# Patient Record
Sex: FEMALE
[De-identification: names, ages, dates, MRNs, and addresses within clinical notes are randomized per-mention and may not be internally consistent; named-entity substitution may affect disease eponyms.]

---

## 2021-05-22 ENCOUNTER — NURSE TRIAGE (OUTPATIENT)
Dept: OTHER | Facility: CLINIC | Age: 46
End: 2021-05-22

## 2021-05-22 NOTE — TELEPHONE ENCOUNTER
Reason for Disposition   Health Information question, no triage required and triager able to answer question    Answer Assessment - Initial Assessment Questions  1. REASON FOR CALL or QUESTION: \"What is your reason for calling today? \" or \"How can I best help you? \" or \"What question do you have that I can help answer? \"      Patient needing diabetic testing supplies that was ordered for patient prior to her discharge. Patient's mother advised to call the facility who discharged her.     Protocols used: INFORMATION ONLY CALL - NO TRIAGE-ADULT-

## 2023-09-13 ENCOUNTER — HOSPITAL ENCOUNTER (OUTPATIENT)
Dept: DATA CONVERSION | Facility: HOSPITAL | Age: 48
End: 2023-09-13

## 2023-09-13 DIAGNOSIS — I25.10 ATHEROSCLEROTIC HEART DISEASE OF NATIVE CORONARY ARTERY WITHOUT ANGINA PECTORIS: ICD-10-CM

## 2023-09-20 PROBLEM — E66.01 MORBID OBESITY (MULTI): Status: ACTIVE | Noted: 2023-09-20

## 2023-09-20 PROBLEM — E78.5 HYPERLIPIDEMIA: Status: ACTIVE | Noted: 2023-09-20

## 2023-09-20 PROBLEM — E55.9 VITAMIN D DEFICIENCY: Status: ACTIVE | Noted: 2023-09-20

## 2023-09-20 PROBLEM — E11.9 DIABETES MELLITUS WITHOUT COMPLICATION (MULTI): Status: ACTIVE | Noted: 2023-09-20

## 2023-09-20 PROBLEM — G47.10 HYPERSOMNIA: Status: ACTIVE | Noted: 2023-09-20

## 2023-09-20 PROBLEM — K21.9 GASTRO-ESOPHAGEAL REFLUX DISEASE WITHOUT ESOPHAGITIS: Status: ACTIVE | Noted: 2023-09-20

## 2023-09-20 PROBLEM — I10 ESSENTIAL HYPERTENSION: Status: ACTIVE | Noted: 2023-09-20

## 2023-09-20 PROBLEM — G47.33 OBSTRUCTIVE SLEEP APNEA: Status: ACTIVE | Noted: 2023-09-20

## 2023-09-20 PROBLEM — M19.90 IDIOPATHIC OSTEOARTHRITIS: Status: ACTIVE | Noted: 2023-09-20

## 2023-09-20 PROBLEM — F41.9 ANXIETY: Status: ACTIVE | Noted: 2023-09-20

## 2023-09-20 RX ORDER — DULAGLUTIDE 1.5 MG/.5ML
INJECTION, SOLUTION SUBCUTANEOUS
COMMUNITY
End: 2024-01-26 | Stop reason: HOSPADM

## 2023-09-20 RX ORDER — OMEPRAZOLE 40 MG/1
CAPSULE, DELAYED RELEASE ORAL
COMMUNITY
Start: 2023-04-28 | End: 2024-03-01 | Stop reason: SINTOL

## 2023-09-20 RX ORDER — EZETIMIBE 10 MG/1
TABLET ORAL
COMMUNITY
Start: 2023-08-22 | End: 2024-04-04

## 2023-09-20 RX ORDER — INSULIN DETEMIR 100 [IU]/ML
35 INJECTION, SOLUTION SUBCUTANEOUS NIGHTLY
COMMUNITY
End: 2024-01-26 | Stop reason: HOSPADM

## 2023-09-20 RX ORDER — ERGOCALCIFEROL 1.25 MG/1
CAPSULE ORAL
COMMUNITY
End: 2024-01-26 | Stop reason: HOSPADM

## 2023-09-20 RX ORDER — LISINOPRIL 40 MG/1
TABLET ORAL
COMMUNITY

## 2023-09-20 RX ORDER — LACTULOSE 10 G/15ML
SOLUTION ORAL
COMMUNITY
End: 2024-01-26 | Stop reason: HOSPADM

## 2023-09-20 RX ORDER — INSULIN ASPART 100 [IU]/ML
INJECTION, SOLUTION INTRAVENOUS; SUBCUTANEOUS
COMMUNITY
End: 2024-03-11

## 2023-09-20 RX ORDER — METFORMIN HYDROCHLORIDE 1000 MG/1
TABLET ORAL
COMMUNITY
End: 2024-01-26 | Stop reason: HOSPADM

## 2023-09-20 RX ORDER — AMLODIPINE BESYLATE 10 MG/1
TABLET ORAL
COMMUNITY

## 2023-09-20 RX ORDER — NAPROXEN SODIUM 220 MG/1
TABLET, FILM COATED ORAL
COMMUNITY
End: 2024-03-11

## 2023-10-09 ENCOUNTER — HOSPITAL ENCOUNTER (OUTPATIENT)
Dept: CARDIOLOGY | Facility: HOSPITAL | Age: 48
Discharge: HOME | End: 2023-10-09
Payer: COMMERCIAL

## 2023-10-09 ENCOUNTER — HOSPITAL ENCOUNTER (OUTPATIENT)
Dept: RADIOLOGY | Facility: HOSPITAL | Age: 48
Discharge: HOME | End: 2023-10-09
Payer: COMMERCIAL

## 2023-10-09 DIAGNOSIS — I25.10 ATHEROSCLEROTIC HEART DISEASE OF NATIVE CORONARY ARTERY WITHOUT ANGINA PECTORIS: ICD-10-CM

## 2023-10-09 PROCEDURE — 3430000001 HC RX 343 DIAGNOSTIC RADIOPHARMACEUTICALS: Performed by: INTERNAL MEDICINE

## 2023-10-09 PROCEDURE — A9502 TC99M TETROFOSMIN: HCPCS | Performed by: INTERNAL MEDICINE

## 2023-10-09 PROCEDURE — 93015 CV STRESS TEST SUPVJ I&R: CPT | Performed by: INTERNAL MEDICINE

## 2023-10-09 RX ORDER — REGADENOSON 0.08 MG/ML
INJECTION, SOLUTION INTRAVENOUS
Status: DISPENSED
Start: 2023-10-09 | End: 2023-10-09

## 2023-10-09 RX ADMIN — TETROFOSMIN 31 MILLICURIE: 0.23 INJECTION, POWDER, LYOPHILIZED, FOR SOLUTION INTRAVENOUS at 12:15

## 2023-10-09 NOTE — PROCEDURES
Procedure performed.  Lexiscan nuclear stress test.    Indications.  Chest pain and shortness of breath.    Brief history.  This is a very pleasantFemale referred by Dr. Smith for stress test as a work-up for bariatric surgery.  Patient denies any chest pain or shortness of breath.    Procedure details.  Informed consent was obtained after discussed patient risks benefits Patient unable to exercise on treadmill due to osteoarthritis and morbid obesity. Patient received 0.4 mg of Lexiscan followed by technetium 99 sestamibi radiotracer around 33 mCi.  Patient developed some shortness of breath but no chest pain.  EKGs remained stable.  Vitals remained stable.  Recovered well without arrhythmias.    Conclusion.  1.  Negative Lexiscan portion of nuclear stress test for ischemia.  2.  For final result please review radiology section where nuclear images result will be reported separately

## 2023-10-10 ENCOUNTER — HOSPITAL ENCOUNTER (OUTPATIENT)
Dept: RADIOLOGY | Facility: HOSPITAL | Age: 48
Discharge: HOME | End: 2023-10-10
Payer: COMMERCIAL

## 2023-10-10 PROCEDURE — A9502 TC99M TETROFOSMIN: HCPCS | Performed by: INTERNAL MEDICINE

## 2023-10-10 PROCEDURE — 3430000001 HC RX 343 DIAGNOSTIC RADIOPHARMACEUTICALS: Performed by: INTERNAL MEDICINE

## 2023-10-10 RX ADMIN — TETROFOSMIN 35 MILLICURIE: 0.23 INJECTION, POWDER, LYOPHILIZED, FOR SOLUTION INTRAVENOUS at 11:15

## 2023-10-25 ENCOUNTER — NUTRITION (OUTPATIENT)
Dept: SURGERY | Facility: CLINIC | Age: 48
End: 2023-10-25
Payer: COMMERCIAL

## 2023-10-25 VITALS — BODY MASS INDEX: 47.94 KG/M2 | WEIGHT: 293 LBS

## 2023-10-25 NOTE — PROGRESS NOTES
Weight Check    Current Weight: 297 lb  Current BMI:  47.94    Reshma is here for a weight check. I provided dietary clearance last month. Pt reports a diet recall of:  Breakfast- a boiled egg, a packet of tuna and a Yoplait light yogurt   Lunch- turkey sandwich, OR soup (2x this past month), OR baked chicken with cauliflower/broccoli, OR a fish sandwich from [employer].   Dinner- Chicken with coleslaw, vegetables and potatoes from [employer]. Pt reports eating potatoes more this past month.  Pt reports eating out 2x this past month.   Snacks: popcorn, pistachios, 1 pack of sunflower seeds   Beverages: water, added crystal light packets, diet coke   Exercise: walking 30 min everyday     Today's Lesson: Reviewed patient's dietary changes and food recall. Reviewed fats and their portion size. Provided a handout to build a healthy plate. Provided a bariatric nutrition guide. Reviewed starchy vs non-starchy vegetables.   Reshma and I reviewed maintaining wt loss during pre-op process.   Diet Goals: Practice the lifelong rules  Exercise Recommendations: Gradually work up to 150 minutes of moderate intensity exercise per week.  Behavior Goals:   1. Decrease snacking.   2. Increase non-starchy vegetables. Decrease/limit starchy vegetables (peas, corn, potatoes).   3. 3 lb wt loss goal.             Vanda Jin RD, LDN  Registered Dietitian, Licensed Dietitian Nutritionist

## 2023-11-17 ENCOUNTER — NUTRITION (OUTPATIENT)
Dept: SURGERY | Facility: CLINIC | Age: 48
End: 2023-11-17
Payer: COMMERCIAL

## 2023-11-17 VITALS — BODY MASS INDEX: 48.1 KG/M2 | WEIGHT: 293 LBS

## 2023-11-17 NOTE — PROGRESS NOTES
Weight Check    Current Weight: 298 lb  Current BMI:  48.10  25 lb wt loss goal (279 lb)     Daily Intake: 2-3 meals. Skipping lunch on occ due to not feeling hungry.   Breakfast (@ 8-8:30 am)- egg and torres sandwich, OR a tuna packet and 2 boiled eggs, OR a yogurt, OR a home-made chicken sandwich with vieyra and leftover chicken breast  Lunch- salad, OR a tuna packet, OR skip   Dinner (@5:30/6 pm)- Lean cusein meal, OR a chicken breast and a salad, OR smoked turkey with greens and corn beef, OR beef and broccoli with snow peas  Snacks: none   Beverages: water, diet pop, Gatorade zero   Exercise: walking 5x/week (M-F) for 30 minutes   Behavior/Diet Changes:  - Eating more greens  - Eliminated snacking   - Increased water intake   - Eliminated eating out     Lavera and I discussed pre-operative weight loss goal. We reviewed the lifelong rules. We discussed the possibility of starting a Robard diet.   Goals for next month:  1. 3 meals per day.  2. Begin to track meals via [employer]. Goal of 1500 calories per day or less.       Plan: Will follow up next month. Will continue to monitor pt's weight, dietary & behavior changes.         Vanda Jin RD, LDN  Registered Dietitian, Licensed Dietitian Nutritionist

## 2023-12-01 DIAGNOSIS — E66.01 OBESITY, MORBID (MULTI): ICD-10-CM

## 2023-12-20 ENCOUNTER — APPOINTMENT (OUTPATIENT)
Dept: SURGERY | Facility: CLINIC | Age: 48
End: 2023-12-20
Payer: COMMERCIAL

## 2024-01-02 ENCOUNTER — LAB (OUTPATIENT)
Dept: LAB | Facility: LAB | Age: 49
End: 2024-01-02
Payer: COMMERCIAL

## 2024-01-02 ENCOUNTER — CLINICAL SUPPORT (OUTPATIENT)
Dept: SURGERY | Facility: CLINIC | Age: 49
End: 2024-01-02
Payer: COMMERCIAL

## 2024-01-02 ENCOUNTER — OFFICE VISIT (OUTPATIENT)
Dept: SURGERY | Facility: CLINIC | Age: 49
End: 2024-01-02
Payer: COMMERCIAL

## 2024-01-02 VITALS
RESPIRATION RATE: 18 BRPM | SYSTOLIC BLOOD PRESSURE: 154 MMHG | BODY MASS INDEX: 47.09 KG/M2 | WEIGHT: 293 LBS | HEIGHT: 66 IN | DIASTOLIC BLOOD PRESSURE: 90 MMHG | HEART RATE: 85 BPM

## 2024-01-02 DIAGNOSIS — I10 PRIMARY HYPERTENSION: Primary | ICD-10-CM

## 2024-01-02 DIAGNOSIS — Z01.818 PREOP EXAMINATION: ICD-10-CM

## 2024-01-02 DIAGNOSIS — I10 PRIMARY HYPERTENSION: ICD-10-CM

## 2024-01-02 DIAGNOSIS — G47.33 OBSTRUCTIVE SLEEP APNEA: ICD-10-CM

## 2024-01-02 DIAGNOSIS — Z86.73 HISTORY OF CVA (CEREBROVASCULAR ACCIDENT): ICD-10-CM

## 2024-01-02 DIAGNOSIS — E11.9 DIABETES MELLITUS WITHOUT COMPLICATION (MULTI): ICD-10-CM

## 2024-01-02 LAB
ALBUMIN SERPL-MCNC: 3.9 G/DL (ref 3.5–5)
ALP BLD-CCNC: 102 U/L (ref 35–125)
ALT SERPL-CCNC: 25 U/L (ref 5–40)
ANION GAP SERPL CALC-SCNC: 15 MMOL/L
AST SERPL-CCNC: 17 U/L (ref 5–40)
BASOPHILS # BLD AUTO: 0.04 X10*3/UL (ref 0–0.1)
BASOPHILS NFR BLD AUTO: 0.5 %
BILIRUB SERPL-MCNC: 0.7 MG/DL (ref 0.1–1.2)
BUN SERPL-MCNC: 12 MG/DL (ref 8–25)
CALCIUM SERPL-MCNC: 9.9 MG/DL (ref 8.5–10.4)
CHLORIDE SERPL-SCNC: 102 MMOL/L (ref 97–107)
CO2 SERPL-SCNC: 26 MMOL/L (ref 24–31)
CREAT SERPL-MCNC: 1 MG/DL (ref 0.4–1.6)
EOSINOPHIL # BLD AUTO: 0.11 X10*3/UL (ref 0–0.7)
EOSINOPHIL NFR BLD AUTO: 1.4 %
ERYTHROCYTE [DISTWIDTH] IN BLOOD BY AUTOMATED COUNT: 12.9 % (ref 11.5–14.5)
GFR SERPL CREATININE-BSD FRML MDRD: 69 ML/MIN/1.73M*2
GLUCOSE SERPL-MCNC: 227 MG/DL (ref 65–99)
HCT VFR BLD AUTO: 35.7 % (ref 36–46)
HGB BLD-MCNC: 11.2 G/DL (ref 12–16)
IMM GRANULOCYTES # BLD AUTO: 0.06 X10*3/UL (ref 0–0.7)
IMM GRANULOCYTES NFR BLD AUTO: 0.8 % (ref 0–0.9)
LYMPHOCYTES # BLD AUTO: 3.36 X10*3/UL (ref 1.2–4.8)
LYMPHOCYTES NFR BLD AUTO: 43.5 %
MCH RBC QN AUTO: 30.1 PG (ref 26–34)
MCHC RBC AUTO-ENTMCNC: 31.4 G/DL (ref 32–36)
MCV RBC AUTO: 96 FL (ref 80–100)
MONOCYTES # BLD AUTO: 0.53 X10*3/UL (ref 0.1–1)
MONOCYTES NFR BLD AUTO: 6.9 %
NEUTROPHILS # BLD AUTO: 3.63 X10*3/UL (ref 1.2–7.7)
NEUTROPHILS NFR BLD AUTO: 46.9 %
NRBC BLD-RTO: 0 /100 WBCS (ref 0–0)
PLATELET # BLD AUTO: 330 X10*3/UL (ref 150–450)
POTASSIUM SERPL-SCNC: 4.2 MMOL/L (ref 3.4–5.1)
PROT SERPL-MCNC: 7.6 G/DL (ref 5.9–7.9)
RBC # BLD AUTO: 3.72 X10*6/UL (ref 4–5.2)
SODIUM SERPL-SCNC: 143 MMOL/L (ref 133–145)
WBC # BLD AUTO: 7.7 X10*3/UL (ref 4.4–11.3)

## 2024-01-02 PROCEDURE — 4010F ACE/ARB THERAPY RXD/TAKEN: CPT | Performed by: INTERNAL MEDICINE

## 2024-01-02 PROCEDURE — 36415 COLL VENOUS BLD VENIPUNCTURE: CPT

## 2024-01-02 PROCEDURE — 80053 COMPREHEN METABOLIC PANEL: CPT

## 2024-01-02 PROCEDURE — 3080F DIAST BP >= 90 MM HG: CPT | Performed by: INTERNAL MEDICINE

## 2024-01-02 PROCEDURE — 1036F TOBACCO NON-USER: CPT | Performed by: INTERNAL MEDICINE

## 2024-01-02 PROCEDURE — 3077F SYST BP >= 140 MM HG: CPT | Performed by: INTERNAL MEDICINE

## 2024-01-02 PROCEDURE — 99214 OFFICE O/P EST MOD 30 MIN: CPT | Performed by: INTERNAL MEDICINE

## 2024-01-02 PROCEDURE — 85025 COMPLETE CBC W/AUTO DIFF WBC: CPT

## 2024-01-02 ASSESSMENT — ENCOUNTER SYMPTOMS
COUGH: 0
DIARRHEA: 0
PALPITATIONS: 0
CONSTIPATION: 0
ABDOMINAL PAIN: 0
DIZZINESS: 0
LOSS OF SENSATION IN FEET: 0
NAUSEA: 0
SHORTNESS OF BREATH: 0
OCCASIONAL FEELINGS OF UNSTEADINESS: 0
DEPRESSION: 0
ARTHRALGIAS: 0

## 2024-01-02 ASSESSMENT — PAIN SCALES - GENERAL: PAINLEVEL: 0-NO PAIN

## 2024-01-02 ASSESSMENT — PATIENT HEALTH QUESTIONNAIRE - PHQ9
SUM OF ALL RESPONSES TO PHQ9 QUESTIONS 1 AND 2: 0
2. FEELING DOWN, DEPRESSED OR HOPELESS: NOT AT ALL
1. LITTLE INTEREST OR PLEASURE IN DOING THINGS: NOT AT ALL

## 2024-01-02 NOTE — PATIENT INSTRUCTIONS
Stop Trulicity one week before surgery. Day before surgery take medications as usual except Metformin, Jardiance, and only take half doses of insulin. No meds the day of surgery. Will discuss with Dr Smith her stress test results and get cardiac clearance. Will check UA since she will start Robard diet

## 2024-01-02 NOTE — PROGRESS NOTES
"Subjective   Patient ID: Reshma Francisco is a 48 y.o. female who presents for preoperative clearance for gastric bypass surgery. Currently has 3 meals a day, protein with each meal. Does not snack. Does not drink sugary beverages. Regarding exercise, she walks regularly. She monitors her blood glucose and reports readings of 150-160. Patient uses CPAP (~5 years old) nightly and tolerates it well. Hx of stroke. She has not taken aspirin for the last year after recommendation from neurologist. She received cardiac clearance from Dr. Smith on Sept 13th and underwent stress test.    Diagnostics Reviewed: 10/2023 stress test showed nontransmural infarction of L lateral wall and septum  Labs Reviewed: 8/2023 cholesterol high. 6/2023 labs WNL        Review of Systems   Respiratory:  Negative for cough and shortness of breath.    Cardiovascular:  Negative for chest pain and palpitations.   Gastrointestinal:  Negative for abdominal pain, constipation, diarrhea and nausea.   Musculoskeletal:  Negative for arthralgias.   Neurological:  Negative for dizziness.       Objective   /90   Pulse 85   Resp 18   Ht 1.676 m (5' 6\")   Wt 138 kg (305 lb)   BMI 49.23 kg/m²     Physical Exam  Constitutional:       Appearance: She is obese.   HENT:      Mouth/Throat:      Comments: Dentition ok  Cardiovascular:      Rate and Rhythm: Normal rate and regular rhythm.   Pulmonary:      Breath sounds: Normal breath sounds.   Abdominal:      General: Bowel sounds are normal.      Palpations: Abdomen is soft.   Musculoskeletal:         General: No swelling.   Neurological:      Mental Status: She is alert.         Assessment/Plan   Diagnoses and all orders for this visit:  Primary hypertension  -     Comprehensive Metabolic Panel; Future  -     CBC and Auto Differential; Future  -     Urinalysis with Reflex Microscopic; Future  Preop examination  -     Comprehensive Metabolic Panel; Future  -     CBC and Auto Differential; " Future  Diabetes mellitus without complication (CMS/HCC)  Obstructive sleep apnea  History of CVA (cerebrovascular accident)      Scribe Attestation  By signing my name below, I, Doris Contreras, Scribe attest that this documentation has been prepared under the direction and in the presence of Jaz Crews MD.

## 2024-01-02 NOTE — PROGRESS NOTES
Pre-Operative Dietary Education     Current Weight: 305 lbs   Current BMI: 49.23     Note : Vanda (RD) reviewed Robard diet for pt to follow before surgery.   Goal weight before surgery: 279 lbs     In class settings, reviewed thin liquids diet that patient will be required to follow for 2 weeks after surgery. Reviewed low calorie fluids along with protein shakes and products that can be consumed. Emphasized the importance of following diet guidelines and recommendations after surgery to prevent complications. Addressed liquids and items to avoid at this time. Patients are educated to drink at least 50 oz of fluid per day, and gradually take in 50g protein per day. Briefly reviewed the diet progression for the next 3-4 months, which will also be discussed at each follow up appointment after surgery. Also reviewed supplementation after bariatric surgery. Patient was instructed to take chewable MVI with iron once daily for the first 6 weeks after surgery. Other supplementation will be introduced at 6 week follow up appointment.     Abigail Pringle, MS, RD, LD

## 2024-01-05 ENCOUNTER — NUTRITION (OUTPATIENT)
Dept: SURGERY | Facility: CLINIC | Age: 49
End: 2024-01-05
Payer: COMMERCIAL

## 2024-01-05 VITALS — WEIGHT: 293 LBS | BODY MASS INDEX: 50.36 KG/M2

## 2024-01-05 NOTE — PROGRESS NOTES
Dietary Follow-up: Instruction to start Abiodun's    Current Weight: 312 lb  Current BMI: 50.36    Reshma is present in the office to start Abiodun VLCD to lose weight and reduce the size of her liver piror to bariatric surgery. I provided and reviewed the Abiodun instructions, as well as helped Reshma with what items to purchase. We reviewed the importance of hydration and the beverages permitted. Reshma's pre-operative weight loss goal is 25 lbs (279lb), taken from her starting weight.     Plan: we will follow-up in 1 week.       Vanda Jin RD, LDN  Registered Dietitian, Licensed Dietitian Nutritionist

## 2024-01-12 ENCOUNTER — NUTRITION (OUTPATIENT)
Dept: SURGERY | Facility: CLINIC | Age: 49
End: 2024-01-12
Payer: COMMERCIAL

## 2024-01-12 VITALS — WEIGHT: 292 LBS | BODY MASS INDEX: 47.13 KG/M2

## 2024-01-12 NOTE — PROGRESS NOTES
Dietary Follow-up: Abiodun's     Current Weight: 292 lb  Current BMI: 47.13     Reshma in present in the office for a weight check. Reshma has been following the Abiodun's diet x 1 week and reports following the diet as instructed. Reshma reports she is staying hydrated throughout her day by consuming water and diet soda. Reshma's weight today reflects a 20 lb wt loss x 1 week. Weight loss goal per Dr. Luke is 279 lb.   Reshma will follow-up on Wednesday, 1/17 with Dr. Crews and the dietitian.         Vanda Jin RD, LDN  Registered Dietitian, Licensed Dietitian Nutritionist

## 2024-01-14 NOTE — PROGRESS NOTES
Subjective      Chief Complaint   Patient presents with    Pre-op Exam          She was seen in September of last year prior to bariatric surgery.  She does have diabetes mellitus hypertension hypercholesterolemia her EKG at that time showed nonspecific ST-T wave changes.  She underwent a Lexiscan stress test which showed ejection fraction of 40% range.  There was normal wall motion the septum had some decreased uptake at rest and exertion as well as a small partial lateral wall.  Throughout these were nontransmural myocardial infarction's.  There was no ischemia noted.  The is doing well and no chest discomfort or sob. The legs are not swelling on her.  She has never known to have an MI in the past           Review of Systems   Constitutional: Negative. Negative for chills and fever.   HENT: Negative.     Eyes: Negative.  Negative for blurred vision and double vision.   Cardiovascular:  Positive for dyspnea on exertion. Negative for leg swelling and near-syncope.   Respiratory: Negative.  Negative for cough.    Endocrine: Negative.    Skin: Negative.    Musculoskeletal:  Positive for joint pain.   Gastrointestinal: Negative.  Negative for bloating.   Genitourinary: Negative.    Neurological: Negative.    All other systems reviewed and are negative.       Past Surgical History:   Procedure Laterality Date    CYST REMOVAL  2021    states between breasts    HYSTERECTOMY  2018        Active Ambulatory Problems     Diagnosis Date Noted    Anxiety 09/20/2023    Diabetes mellitus without complication (CMS/HCC) 09/20/2023    Essential hypertension 09/20/2023    Gastro-esophageal reflux disease without esophagitis 09/20/2023    Hyperlipidemia 09/20/2023    Hypersomnia 09/20/2023    Idiopathic osteoarthritis 09/20/2023    Morbid obesity (CMS/HCC) 09/20/2023    Obstructive sleep apnea 09/20/2023    Vitamin D deficiency 09/20/2023    Obesity, morbid (CMS/HCC) 12/01/2023    Preop cardiovascular exam 01/15/2024     Resolved  "Ambulatory Problems     Diagnosis Date Noted    No Resolved Ambulatory Problems     Past Medical History:   Diagnosis Date    Depression     DM (diabetes mellitus) (CMS/HCC)     HTN (hypertension)         Visit Vitals  /86   Pulse 80   Wt 133 kg (293 lb)   SpO2 98%   BMI 47.29 kg/m²   OB Status Hysterectomy   Smoking Status Never   BSA 2.49 m²        Objective     Cardiovascular:      PMI at left midclavicular line. Normal rate. Regular rhythm. Normal S1. Normal S2.       Murmurs: There is no murmur.      No gallop.  No click. No rub.   Pulses:     Intact distal pulses.            Lab Review:         Lab Results   Component Value Date    CHOL 203 (H) 12/13/2019     Lab Results   Component Value Date    HDL 56 12/13/2019     Lab Results   Component Value Date    LDLCALC 111 12/13/2019     Lab Results   Component Value Date    TRIG 178 (H) 12/13/2019     No components found for: \"CHOLHDL\"     Assessment/Plan     Preop cardiovascular exam  This is a 48 year old black female who needs bariatic surgery.  She has no history of an MI nor symptoms of CHF.  She does have DM and HTN. The EKG shows LVH LAD and poor R wave across the precordium which is unchanged from  last May.  The BP is up today and will add a diuretic.  The stress last year shows no ischemia yet may have a decreased EF and remote scars.  Will clear for her surgery cardiac wise.  Will see afterwards and further address the LV function.    Hyperlipidemia  Has not been checked for sometime and will recheck when she comes back    Essential hypertension  Is elevated and will start on hygrotin     "

## 2024-01-15 ENCOUNTER — OFFICE VISIT (OUTPATIENT)
Dept: CARDIOLOGY | Facility: CLINIC | Age: 49
End: 2024-01-15
Payer: COMMERCIAL

## 2024-01-15 VITALS
OXYGEN SATURATION: 98 % | BODY MASS INDEX: 47.29 KG/M2 | SYSTOLIC BLOOD PRESSURE: 140 MMHG | DIASTOLIC BLOOD PRESSURE: 86 MMHG | WEIGHT: 293 LBS | HEART RATE: 80 BPM

## 2024-01-15 DIAGNOSIS — I10 ESSENTIAL HYPERTENSION: ICD-10-CM

## 2024-01-15 DIAGNOSIS — Z01.810 PREOP CARDIOVASCULAR EXAM: Primary | ICD-10-CM

## 2024-01-15 DIAGNOSIS — Z01.818 PRE-OPERATIVE CLEARANCE: ICD-10-CM

## 2024-01-15 DIAGNOSIS — E78.00 PURE HYPERCHOLESTEROLEMIA: ICD-10-CM

## 2024-01-15 PROCEDURE — 1036F TOBACCO NON-USER: CPT | Performed by: INTERNAL MEDICINE

## 2024-01-15 PROCEDURE — 4010F ACE/ARB THERAPY RXD/TAKEN: CPT | Performed by: INTERNAL MEDICINE

## 2024-01-15 PROCEDURE — 3077F SYST BP >= 140 MM HG: CPT | Performed by: INTERNAL MEDICINE

## 2024-01-15 PROCEDURE — 93005 ELECTROCARDIOGRAM TRACING: CPT | Performed by: INTERNAL MEDICINE

## 2024-01-15 PROCEDURE — 99214 OFFICE O/P EST MOD 30 MIN: CPT | Performed by: INTERNAL MEDICINE

## 2024-01-15 PROCEDURE — 93010 ELECTROCARDIOGRAM REPORT: CPT | Performed by: INTERNAL MEDICINE

## 2024-01-15 PROCEDURE — 3079F DIAST BP 80-89 MM HG: CPT | Performed by: INTERNAL MEDICINE

## 2024-01-15 RX ORDER — CHLORTHALIDONE 25 MG/1
25 TABLET ORAL DAILY
Qty: 90 TABLET | Refills: 3 | Status: SHIPPED | OUTPATIENT
Start: 2024-01-15 | End: 2024-01-26 | Stop reason: HOSPADM

## 2024-01-15 ASSESSMENT — ENCOUNTER SYMPTOMS
COUGH: 0
RESPIRATORY NEGATIVE: 1
GASTROINTESTINAL NEGATIVE: 1
DYSPNEA ON EXERTION: 1
FEVER: 0
EYES NEGATIVE: 1
NEAR-SYNCOPE: 0
CHILLS: 0
CONSTITUTIONAL NEGATIVE: 1
BLOATING: 0
DOUBLE VISION: 0
NEUROLOGICAL NEGATIVE: 1
ENDOCRINE NEGATIVE: 1
BLURRED VISION: 0

## 2024-01-15 ASSESSMENT — PAIN SCALES - GENERAL: PAINLEVEL: 0-NO PAIN

## 2024-01-15 NOTE — ASSESSMENT & PLAN NOTE
This is a 48 year old black female who needs bariatic surgery.  She has no history of an MI nor symptoms of CHF.  She does have DM and HTN. The EKG shows LVH LAD and poor R wave across the precordium which is unchanged from  last May.  The BP is up today and will add a diuretic.  The stress last year shows no ischemia yet may have a decreased EF and remote scars.  Will clear for her surgery cardiac wise.  Will see afterwards and further address the LV function.

## 2024-01-17 ENCOUNTER — OFFICE VISIT (OUTPATIENT)
Dept: SURGERY | Facility: CLINIC | Age: 49
End: 2024-01-17
Payer: COMMERCIAL

## 2024-01-17 ENCOUNTER — NUTRITION (OUTPATIENT)
Dept: SURGERY | Facility: CLINIC | Age: 49
End: 2024-01-17
Payer: COMMERCIAL

## 2024-01-17 VITALS
RESPIRATION RATE: 16 BRPM | HEIGHT: 66 IN | BODY MASS INDEX: 46.12 KG/M2 | SYSTOLIC BLOOD PRESSURE: 143 MMHG | WEIGHT: 287 LBS | DIASTOLIC BLOOD PRESSURE: 85 MMHG | HEART RATE: 101 BPM

## 2024-01-17 DIAGNOSIS — Z86.73 HISTORY OF CVA (CEREBROVASCULAR ACCIDENT): ICD-10-CM

## 2024-01-17 DIAGNOSIS — G47.33 OBSTRUCTIVE SLEEP APNEA: ICD-10-CM

## 2024-01-17 DIAGNOSIS — I10 PRIMARY HYPERTENSION: Primary | ICD-10-CM

## 2024-01-17 DIAGNOSIS — E11.9 DIABETES MELLITUS WITHOUT COMPLICATION (MULTI): ICD-10-CM

## 2024-01-17 PROCEDURE — 3077F SYST BP >= 140 MM HG: CPT | Performed by: INTERNAL MEDICINE

## 2024-01-17 PROCEDURE — 4010F ACE/ARB THERAPY RXD/TAKEN: CPT | Performed by: INTERNAL MEDICINE

## 2024-01-17 PROCEDURE — 1036F TOBACCO NON-USER: CPT | Performed by: INTERNAL MEDICINE

## 2024-01-17 PROCEDURE — 3079F DIAST BP 80-89 MM HG: CPT | Performed by: INTERNAL MEDICINE

## 2024-01-17 PROCEDURE — 99213 OFFICE O/P EST LOW 20 MIN: CPT | Performed by: INTERNAL MEDICINE

## 2024-01-17 ASSESSMENT — ENCOUNTER SYMPTOMS
ABDOMINAL PAIN: 0
OCCASIONAL FEELINGS OF UNSTEADINESS: 0
DIZZINESS: 0
PALPITATIONS: 0
CONSTIPATION: 0
DEPRESSION: 0
NAUSEA: 0
COUGH: 0
LOSS OF SENSATION IN FEET: 0
ARTHRALGIAS: 0
DIARRHEA: 0
SHORTNESS OF BREATH: 0

## 2024-01-17 ASSESSMENT — PAIN SCALES - GENERAL: PAINLEVEL: 0-NO PAIN

## 2024-01-17 ASSESSMENT — PATIENT HEALTH QUESTIONNAIRE - PHQ9
1. LITTLE INTEREST OR PLEASURE IN DOING THINGS: NOT AT ALL
2. FEELING DOWN, DEPRESSED OR HOPELESS: NOT AT ALL
SUM OF ALL RESPONSES TO PHQ9 QUESTIONS 1 AND 2: 0

## 2024-01-17 NOTE — PROGRESS NOTES
"Subjective   Patient ID: Reshma Francisco is a 48 y.o. female who presents for [facility] visit. She started Robard diet, three meals a day. Does not snack or drink sugary beverages. Regarding exercise, she walks daily at work. Patient uses CPAP (~5 years old) nightly and tolerates it well. Received cardiac clearance from  and will see him again in 2 months.    Diagnostics Reviewed: 10/2023 stress test showed nontransmural infarction of L lateral wall and septum, EF 42%.  Labs Reviewed: 8/2023 cholesterol high. 6/2023 labs WNL         Review of Systems   Respiratory:  Negative for cough and shortness of breath.    Cardiovascular:  Negative for chest pain and palpitations.   Gastrointestinal:  Negative for abdominal pain, constipation, diarrhea and nausea.   Musculoskeletal:  Negative for arthralgias.   Neurological:  Negative for dizziness.       Objective   /85   Pulse 101   Resp 16   Ht 1.676 m (5' 6\")   Wt 130 kg (287 lb)   BMI 46.32 kg/m²     Physical Exam  Constitutional:       Appearance: She is obese.   HENT:      Mouth/Throat:      Comments: Dentition ok  Cardiovascular:      Rate and Rhythm: Normal rate and regular rhythm.   Pulmonary:      Breath sounds: Normal breath sounds.   Abdominal:      General: Bowel sounds are normal.      Palpations: Abdomen is soft.   Musculoskeletal:         General: No swelling.   Neurological:      Mental Status: She is alert.         Assessment/Plan   There are no diagnoses linked to this encounter.      Scribe Attestation  By signing my name below, IDoris, Scribluiz   attest that this documentation has been prepared under the direction and in the presence of Jaz Crews MD.  "
no

## 2024-01-17 NOTE — PROGRESS NOTES
Dietary Follow-up: Abiodun's     Weight on 1/5/2024: 312 lbs   Weight on 1/12/2024: 292 lbs   Today's Current Weight on 1/17/2024 : 287 lbs / lost 5 lbs since last visit in the office on 1/12   Current BMI: 46.32   Goal weight before surgery (scheduled for Wed 1/24) : 279 lbs     Reshma is following the Abiodun (VLCD). She is having 3 meal replacements and a protein bar daily. She is overall down 25 lbs in less than 2 weeks. She reports that she is feeling okay.   I instructed Reshma to continue Abiodun diet at this time, and to follow until Monday night. Informed Reshma that she will do clear liquid diet & bowel prep on Tuesday, the day before surgery.   Informed Reshma that her goal weight before surgery is 279 lbs. Discouraged gaining weight before surgery.     Reshma has no dietary questions or concerns at this time.     Abigail Pringle, MS, RD, LD

## 2024-01-18 ENCOUNTER — TELEPHONE (OUTPATIENT)
Dept: SURGERY | Facility: CLINIC | Age: 49
End: 2024-01-18
Payer: COMMERCIAL

## 2024-01-18 NOTE — TELEPHONE ENCOUNTER
I called Reshma to check-in regarding her current supply of Robard products. I offered for Reshma to come into the office today to purchase more products as needed and made her aware of our office hours. Reshma is instructed to follow Abiodun's through Monday, January 22.  Reshma reports that tomorrow works better for her, weather permitting. I will follow-up with her tomorrow.     Vanda Jin RD, LDN  Registered Dietitian, Licensed Dietitian Nutritionist

## 2024-01-24 ENCOUNTER — ANESTHESIA EVENT (OUTPATIENT)
Dept: OPERATING ROOM | Facility: HOSPITAL | Age: 49
DRG: 620 | End: 2024-01-24
Payer: COMMERCIAL

## 2024-01-24 ENCOUNTER — HOSPITAL ENCOUNTER (INPATIENT)
Facility: HOSPITAL | Age: 49
LOS: 2 days | Discharge: HOME | DRG: 620 | End: 2024-01-26
Attending: SURGERY | Admitting: SURGERY
Payer: COMMERCIAL

## 2024-01-24 ENCOUNTER — ANESTHESIA (OUTPATIENT)
Dept: OPERATING ROOM | Facility: HOSPITAL | Age: 49
DRG: 620 | End: 2024-01-24
Payer: COMMERCIAL

## 2024-01-24 DIAGNOSIS — K44.9 HIATAL HERNIA: ICD-10-CM

## 2024-01-24 DIAGNOSIS — K21.00 GASTROESOPHAGEAL REFLUX DISEASE WITH ESOPHAGITIS WITHOUT HEMORRHAGE: ICD-10-CM

## 2024-01-24 DIAGNOSIS — E66.01 MORBID OBESITY WITH BMI OF 40.0-44.9, ADULT (MULTI): Primary | ICD-10-CM

## 2024-01-24 DIAGNOSIS — E88.810 METABOLIC SYNDROME: ICD-10-CM

## 2024-01-24 DIAGNOSIS — E11.9 DIABETES MELLITUS WITHOUT COMPLICATION (MULTI): ICD-10-CM

## 2024-01-24 DIAGNOSIS — G47.33 OBSTRUCTIVE SLEEP APNEA: ICD-10-CM

## 2024-01-24 DIAGNOSIS — E78.00 HYPERCHOLESTEROLEMIA: ICD-10-CM

## 2024-01-24 DIAGNOSIS — E66.01 OBESITY, MORBID (MULTI): ICD-10-CM

## 2024-01-24 DIAGNOSIS — I10 PRIMARY HYPERTENSION: ICD-10-CM

## 2024-01-24 DIAGNOSIS — I25.10 CORONARY ARTERY DISEASE INVOLVING NATIVE CORONARY ARTERY OF NATIVE HEART WITHOUT ANGINA PECTORIS: ICD-10-CM

## 2024-01-24 DIAGNOSIS — E11.9 TYPE 2 DIABETES MELLITUS TREATED WITHOUT INSULIN (MULTI): ICD-10-CM

## 2024-01-24 PROBLEM — K21.9 GASTRO-ESOPHAGEAL REFLUX DISEASE WITHOUT ESOPHAGITIS: Status: RESOLVED | Noted: 2023-09-20 | Resolved: 2024-01-24

## 2024-01-24 PROBLEM — I50.9 CHF (CONGESTIVE HEART FAILURE) (MULTI): Status: ACTIVE | Noted: 2024-01-24

## 2024-01-24 LAB
ANION GAP SERPL CALC-SCNC: 14 MMOL/L
BASOPHILS # BLD AUTO: 0.03 X10*3/UL (ref 0–0.1)
BASOPHILS NFR BLD AUTO: 0.7 %
BUN SERPL-MCNC: 13 MG/DL (ref 8–25)
CALCIUM SERPL-MCNC: 9 MG/DL (ref 8.5–10.4)
CHLORIDE SERPL-SCNC: 105 MMOL/L (ref 97–107)
CO2 SERPL-SCNC: 24 MMOL/L (ref 24–31)
CREAT SERPL-MCNC: 1.1 MG/DL (ref 0.4–1.6)
EGFRCR SERPLBLD CKD-EPI 2021: 61 ML/MIN/1.73M*2
EOSINOPHIL # BLD AUTO: 0.08 X10*3/UL (ref 0–0.7)
EOSINOPHIL NFR BLD AUTO: 1.8 %
ERYTHROCYTE [DISTWIDTH] IN BLOOD BY AUTOMATED COUNT: 12 % (ref 11.5–14.5)
GLUCOSE BLD MANUAL STRIP-MCNC: 140 MG/DL (ref 74–99)
GLUCOSE BLD MANUAL STRIP-MCNC: 160 MG/DL (ref 74–99)
GLUCOSE BLD MANUAL STRIP-MCNC: 164 MG/DL (ref 74–99)
GLUCOSE BLD MANUAL STRIP-MCNC: 169 MG/DL (ref 74–99)
GLUCOSE BLD MANUAL STRIP-MCNC: 172 MG/DL (ref 74–99)
GLUCOSE BLD MANUAL STRIP-MCNC: 172 MG/DL (ref 74–99)
GLUCOSE BLD MANUAL STRIP-MCNC: 181 MG/DL (ref 74–99)
GLUCOSE BLD MANUAL STRIP-MCNC: 183 MG/DL (ref 74–99)
GLUCOSE SERPL-MCNC: 132 MG/DL (ref 65–99)
HCT VFR BLD AUTO: 33.9 % (ref 36–46)
HGB BLD-MCNC: 11.2 G/DL (ref 12–16)
IMM GRANULOCYTES # BLD AUTO: 0 X10*3/UL (ref 0–0.7)
IMM GRANULOCYTES NFR BLD AUTO: 0 % (ref 0–0.9)
LYMPHOCYTES # BLD AUTO: 2.08 X10*3/UL (ref 1.2–4.8)
LYMPHOCYTES NFR BLD AUTO: 47.9 %
MCH RBC QN AUTO: 30.4 PG (ref 26–34)
MCHC RBC AUTO-ENTMCNC: 33 G/DL (ref 32–36)
MCV RBC AUTO: 92 FL (ref 80–100)
MONOCYTES # BLD AUTO: 0.37 X10*3/UL (ref 0.1–1)
MONOCYTES NFR BLD AUTO: 8.5 %
NEUTROPHILS # BLD AUTO: 1.78 X10*3/UL (ref 1.2–7.7)
NEUTROPHILS NFR BLD AUTO: 41.1 %
NRBC BLD-RTO: 0 /100 WBCS (ref 0–0)
PLATELET # BLD AUTO: 309 X10*3/UL (ref 150–450)
POTASSIUM SERPL-SCNC: 3.4 MMOL/L (ref 3.4–5.1)
RBC # BLD AUTO: 3.68 X10*6/UL (ref 4–5.2)
SODIUM SERPL-SCNC: 143 MMOL/L (ref 133–145)
WBC # BLD AUTO: 4.3 X10*3/UL (ref 4.4–11.3)

## 2024-01-24 PROCEDURE — 2720000007 HC OR 272 NO HCPCS: Performed by: SURGERY

## 2024-01-24 PROCEDURE — 2500000004 HC RX 250 GENERAL PHARMACY W/ HCPCS (ALT 636 FOR OP/ED)

## 2024-01-24 PROCEDURE — 82947 ASSAY GLUCOSE BLOOD QUANT: CPT

## 2024-01-24 PROCEDURE — 3700000001 HC GENERAL ANESTHESIA TIME - INITIAL BASE CHARGE: Performed by: SURGERY

## 2024-01-24 PROCEDURE — 2500000002 HC RX 250 W HCPCS SELF ADMINISTERED DRUGS (ALT 637 FOR MEDICARE OP, ALT 636 FOR OP/ED): Performed by: INTERNAL MEDICINE

## 2024-01-24 PROCEDURE — 3600000004 HC OR TIME - INITIAL BASE CHARGE - PROCEDURE LEVEL FOUR: Performed by: SURGERY

## 2024-01-24 PROCEDURE — 2500000004 HC RX 250 GENERAL PHARMACY W/ HCPCS (ALT 636 FOR OP/ED): Performed by: ANESTHESIOLOGY

## 2024-01-24 PROCEDURE — 2500000004 HC RX 250 GENERAL PHARMACY W/ HCPCS (ALT 636 FOR OP/ED): Performed by: SURGERY

## 2024-01-24 PROCEDURE — 2500000004 HC RX 250 GENERAL PHARMACY W/ HCPCS (ALT 636 FOR OP/ED): Performed by: INTERNAL MEDICINE

## 2024-01-24 PROCEDURE — 85025 COMPLETE CBC W/AUTO DIFF WBC: CPT

## 2024-01-24 PROCEDURE — 3700000002 HC GENERAL ANESTHESIA TIME - EACH INCREMENTAL 1 MINUTE: Performed by: SURGERY

## 2024-01-24 PROCEDURE — 2500000004 HC RX 250 GENERAL PHARMACY W/ HCPCS (ALT 636 FOR OP/ED): Performed by: ANESTHESIOLOGIST ASSISTANT

## 2024-01-24 PROCEDURE — 80048 BASIC METABOLIC PNL TOTAL CA: CPT

## 2024-01-24 PROCEDURE — 43644 LAP GASTRIC BYPASS/ROUX-EN-Y: CPT | Performed by: SURGERY

## 2024-01-24 PROCEDURE — 2500000005 HC RX 250 GENERAL PHARMACY W/O HCPCS: Performed by: INTERNAL MEDICINE

## 2024-01-24 PROCEDURE — 36415 COLL VENOUS BLD VENIPUNCTURE: CPT

## 2024-01-24 PROCEDURE — 3600000009 HC OR TIME - EACH INCREMENTAL 1 MINUTE - PROCEDURE LEVEL FOUR: Performed by: SURGERY

## 2024-01-24 PROCEDURE — 7100000001 HC RECOVERY ROOM TIME - INITIAL BASE CHARGE: Performed by: SURGERY

## 2024-01-24 PROCEDURE — 2500000004 HC RX 250 GENERAL PHARMACY W/ HCPCS (ALT 636 FOR OP/ED): Performed by: STUDENT IN AN ORGANIZED HEALTH CARE EDUCATION/TRAINING PROGRAM

## 2024-01-24 PROCEDURE — 2500000005 HC RX 250 GENERAL PHARMACY W/O HCPCS: Performed by: SURGERY

## 2024-01-24 PROCEDURE — 7100000002 HC RECOVERY ROOM TIME - EACH INCREMENTAL 1 MINUTE: Performed by: SURGERY

## 2024-01-24 PROCEDURE — 99222 1ST HOSP IP/OBS MODERATE 55: CPT | Performed by: INTERNAL MEDICINE

## 2024-01-24 PROCEDURE — 43281 LAP PARAESOPHAG HERN REPAIR: CPT | Performed by: SURGERY

## 2024-01-24 PROCEDURE — C9113 INJ PANTOPRAZOLE SODIUM, VIA: HCPCS

## 2024-01-24 PROCEDURE — 1200000002 HC GENERAL ROOM WITH TELEMETRY DAILY

## 2024-01-24 PROCEDURE — 0D164ZA BYPASS STOMACH TO JEJUNUM, PERCUTANEOUS ENDOSCOPIC APPROACH: ICD-10-PCS | Performed by: SURGERY

## 2024-01-24 PROCEDURE — 2500000005 HC RX 250 GENERAL PHARMACY W/O HCPCS: Performed by: ANESTHESIOLOGIST ASSISTANT

## 2024-01-24 RX ORDER — LABETALOL HYDROCHLORIDE 5 MG/ML
5 INJECTION, SOLUTION INTRAVENOUS EVERY 5 MIN PRN
Status: COMPLETED | OUTPATIENT
Start: 2024-01-24 | End: 2024-01-24

## 2024-01-24 RX ORDER — SODIUM CHLORIDE, SODIUM LACTATE, POTASSIUM CHLORIDE, CALCIUM CHLORIDE 600; 310; 30; 20 MG/100ML; MG/100ML; MG/100ML; MG/100ML
100 INJECTION, SOLUTION INTRAVENOUS CONTINUOUS
Status: DISCONTINUED | OUTPATIENT
Start: 2024-01-24 | End: 2024-01-24

## 2024-01-24 RX ORDER — DEXAMETHASONE SODIUM PHOSPHATE 4 MG/ML
INJECTION, SOLUTION INTRA-ARTICULAR; INTRALESIONAL; INTRAMUSCULAR; INTRAVENOUS; SOFT TISSUE AS NEEDED
Status: DISCONTINUED | OUTPATIENT
Start: 2024-01-24 | End: 2024-01-24

## 2024-01-24 RX ORDER — INSULIN DETEMIR 100 [IU]/ML
20 INJECTION, SOLUTION SUBCUTANEOUS NIGHTLY
Qty: 10 ML | Refills: 0 | Status: SHIPPED | OUTPATIENT
Start: 2024-01-24

## 2024-01-24 RX ORDER — BUPRENORPHINE HYDROCHLORIDE 0.32 MG/ML
0.1 INJECTION INTRAMUSCULAR; INTRAVENOUS EVERY 6 HOURS PRN
Status: DISCONTINUED | OUTPATIENT
Start: 2024-01-24 | End: 2024-01-26 | Stop reason: HOSPADM

## 2024-01-24 RX ORDER — INSULIN LISPRO 100 [IU]/ML
0-10 INJECTION, SOLUTION INTRAVENOUS; SUBCUTANEOUS EVERY 4 HOURS
Status: DISCONTINUED | OUTPATIENT
Start: 2024-01-24 | End: 2024-01-26 | Stop reason: HOSPADM

## 2024-01-24 RX ORDER — FENTANYL CITRATE 50 UG/ML
INJECTION, SOLUTION INTRAMUSCULAR; INTRAVENOUS AS NEEDED
Status: DISCONTINUED | OUTPATIENT
Start: 2024-01-24 | End: 2024-01-24

## 2024-01-24 RX ORDER — HYDRALAZINE HYDROCHLORIDE 20 MG/ML
5 INJECTION INTRAMUSCULAR; INTRAVENOUS EVERY 4 HOURS PRN
Status: DISCONTINUED | OUTPATIENT
Start: 2024-01-24 | End: 2024-01-24 | Stop reason: SDUPTHER

## 2024-01-24 RX ORDER — ONDANSETRON HYDROCHLORIDE 2 MG/ML
4 INJECTION, SOLUTION INTRAVENOUS EVERY 6 HOURS PRN
Status: DISCONTINUED | OUTPATIENT
Start: 2024-01-24 | End: 2024-01-26 | Stop reason: HOSPADM

## 2024-01-24 RX ORDER — NALOXONE HYDROCHLORIDE 0.4 MG/ML
0.2 INJECTION, SOLUTION INTRAMUSCULAR; INTRAVENOUS; SUBCUTANEOUS EVERY 5 MIN PRN
Status: DISCONTINUED | OUTPATIENT
Start: 2024-01-24 | End: 2024-01-26 | Stop reason: HOSPADM

## 2024-01-24 RX ORDER — BUPIVACAINE HYDROCHLORIDE 5 MG/ML
INJECTION, SOLUTION EPIDURAL; INTRACAUDAL AS NEEDED
Status: DISCONTINUED | OUTPATIENT
Start: 2024-01-24 | End: 2024-01-24 | Stop reason: HOSPADM

## 2024-01-24 RX ORDER — METOCLOPRAMIDE HYDROCHLORIDE 5 MG/ML
10 INJECTION INTRAMUSCULAR; INTRAVENOUS EVERY 6 HOURS PRN
Status: DISCONTINUED | OUTPATIENT
Start: 2024-01-24 | End: 2024-01-24

## 2024-01-24 RX ORDER — MIDAZOLAM HYDROCHLORIDE 1 MG/ML
INJECTION, SOLUTION INTRAMUSCULAR; INTRAVENOUS AS NEEDED
Status: DISCONTINUED | OUTPATIENT
Start: 2024-01-24 | End: 2024-01-24

## 2024-01-24 RX ORDER — ACETAMINOPHEN 10 MG/ML
1000 INJECTION, SOLUTION INTRAVENOUS EVERY 6 HOURS
Status: DISPENSED | OUTPATIENT
Start: 2024-01-24 | End: 2024-01-26

## 2024-01-24 RX ORDER — SCOLOPAMINE TRANSDERMAL SYSTEM 1 MG/1
1 PATCH, EXTENDED RELEASE TRANSDERMAL ONCE
Status: DISCONTINUED | OUTPATIENT
Start: 2024-01-24 | End: 2024-01-24

## 2024-01-24 RX ORDER — METOPROLOL TARTRATE 1 MG/ML
5 INJECTION, SOLUTION INTRAVENOUS EVERY 6 HOURS
Status: DISCONTINUED | OUTPATIENT
Start: 2024-01-24 | End: 2024-01-26

## 2024-01-24 RX ORDER — LIDOCAINE HYDROCHLORIDE 10 MG/ML
INJECTION INFILTRATION; PERINEURAL AS NEEDED
Status: DISCONTINUED | OUTPATIENT
Start: 2024-01-24 | End: 2024-01-24 | Stop reason: HOSPADM

## 2024-01-24 RX ORDER — SODIUM CHLORIDE 9 MG/ML
INJECTION INTRAMUSCULAR; INTRAVENOUS; SUBCUTANEOUS AS NEEDED
Status: DISCONTINUED | OUTPATIENT
Start: 2024-01-24 | End: 2024-01-24 | Stop reason: HOSPADM

## 2024-01-24 RX ORDER — INSULIN GLARGINE 100 [IU]/ML
20 INJECTION, SOLUTION SUBCUTANEOUS EVERY 24 HOURS
Status: DISCONTINUED | OUTPATIENT
Start: 2024-01-24 | End: 2024-01-26 | Stop reason: HOSPADM

## 2024-01-24 RX ORDER — PANTOPRAZOLE SODIUM 40 MG/10ML
40 INJECTION, POWDER, LYOPHILIZED, FOR SOLUTION INTRAVENOUS
Status: DISCONTINUED | OUTPATIENT
Start: 2024-01-25 | End: 2024-01-26 | Stop reason: HOSPADM

## 2024-01-24 RX ORDER — SODIUM CHLORIDE, SODIUM LACTATE, POTASSIUM CHLORIDE, CALCIUM CHLORIDE 600; 310; 30; 20 MG/100ML; MG/100ML; MG/100ML; MG/100ML
40 INJECTION, SOLUTION INTRAVENOUS CONTINUOUS
Status: DISCONTINUED | OUTPATIENT
Start: 2024-01-24 | End: 2024-01-24 | Stop reason: HOSPADM

## 2024-01-24 RX ORDER — DEXTROSE 50 % IN WATER (D50W) INTRAVENOUS SYRINGE
25
Status: DISCONTINUED | OUTPATIENT
Start: 2024-01-24 | End: 2024-01-26 | Stop reason: HOSPADM

## 2024-01-24 RX ORDER — GLYCOPYRROLATE 0.2 MG/ML
INJECTION INTRAMUSCULAR; INTRAVENOUS AS NEEDED
Status: DISCONTINUED | OUTPATIENT
Start: 2024-01-24 | End: 2024-01-24

## 2024-01-24 RX ORDER — METOCLOPRAMIDE HYDROCHLORIDE 5 MG/ML
10 INJECTION INTRAMUSCULAR; INTRAVENOUS ONCE
Status: COMPLETED | OUTPATIENT
Start: 2024-01-24 | End: 2024-01-24

## 2024-01-24 RX ORDER — ENOXAPARIN SODIUM 100 MG/ML
40 INJECTION SUBCUTANEOUS
Status: DISCONTINUED | OUTPATIENT
Start: 2024-01-25 | End: 2024-01-26 | Stop reason: HOSPADM

## 2024-01-24 RX ORDER — HEPARIN SODIUM 5000 [USP'U]/ML
5000 INJECTION, SOLUTION INTRAVENOUS; SUBCUTANEOUS ONCE
Status: COMPLETED | OUTPATIENT
Start: 2024-01-24 | End: 2024-01-24

## 2024-01-24 RX ORDER — LISINOPRIL 40 MG/1
40 TABLET ORAL DAILY
Status: DISCONTINUED | OUTPATIENT
Start: 2024-01-24 | End: 2024-01-24

## 2024-01-24 RX ORDER — ONDANSETRON HYDROCHLORIDE 2 MG/ML
4 INJECTION, SOLUTION INTRAVENOUS ONCE AS NEEDED
Status: DISCONTINUED | OUTPATIENT
Start: 2024-01-24 | End: 2024-01-24 | Stop reason: HOSPADM

## 2024-01-24 RX ORDER — IPRATROPIUM BROMIDE 0.5 MG/2.5ML
500 SOLUTION RESPIRATORY (INHALATION) EVERY 30 MIN PRN
Status: DISCONTINUED | OUTPATIENT
Start: 2024-01-24 | End: 2024-01-24 | Stop reason: HOSPADM

## 2024-01-24 RX ORDER — FENTANYL CITRATE 50 UG/ML
50 INJECTION, SOLUTION INTRAMUSCULAR; INTRAVENOUS EVERY 5 MIN PRN
Status: DISCONTINUED | OUTPATIENT
Start: 2024-01-24 | End: 2024-01-24 | Stop reason: HOSPADM

## 2024-01-24 RX ORDER — PHENYLEPHRINE HCL IN 0.9% NACL 1 MG/10 ML
SYRINGE (ML) INTRAVENOUS AS NEEDED
Status: DISCONTINUED | OUTPATIENT
Start: 2024-01-24 | End: 2024-01-24

## 2024-01-24 RX ORDER — SODIUM CHLORIDE, SODIUM LACTATE, POTASSIUM CHLORIDE, CALCIUM CHLORIDE 600; 310; 30; 20 MG/100ML; MG/100ML; MG/100ML; MG/100ML
125 INJECTION, SOLUTION INTRAVENOUS CONTINUOUS
Status: DISCONTINUED | OUTPATIENT
Start: 2024-01-24 | End: 2024-01-26 | Stop reason: HOSPADM

## 2024-01-24 RX ORDER — HYDRALAZINE HYDROCHLORIDE 20 MG/ML
5 INJECTION INTRAMUSCULAR; INTRAVENOUS ONCE
Status: COMPLETED | OUTPATIENT
Start: 2024-01-24 | End: 2024-01-24

## 2024-01-24 RX ORDER — NEOSTIGMINE METHYLSULFATE 1 MG/ML
INJECTION, SOLUTION INTRAVENOUS AS NEEDED
Status: DISCONTINUED | OUTPATIENT
Start: 2024-01-24 | End: 2024-01-24

## 2024-01-24 RX ORDER — ACETAMINOPHEN 10 MG/ML
1000 INJECTION, SOLUTION INTRAVENOUS ONCE
Status: COMPLETED | OUTPATIENT
Start: 2024-01-24 | End: 2024-01-24

## 2024-01-24 RX ORDER — ALBUTEROL SULFATE 0.83 MG/ML
2.5 SOLUTION RESPIRATORY (INHALATION) EVERY 30 MIN PRN
Status: DISCONTINUED | OUTPATIENT
Start: 2024-01-24 | End: 2024-01-24 | Stop reason: HOSPADM

## 2024-01-24 RX ORDER — LIDOCAINE HYDROCHLORIDE 10 MG/ML
INJECTION, SOLUTION EPIDURAL; INFILTRATION; INTRACAUDAL; PERINEURAL AS NEEDED
Status: DISCONTINUED | OUTPATIENT
Start: 2024-01-24 | End: 2024-01-24

## 2024-01-24 RX ORDER — LISINOPRIL 40 MG/1
40 TABLET ORAL DAILY
Status: DISCONTINUED | OUTPATIENT
Start: 2024-01-25 | End: 2024-01-26 | Stop reason: HOSPADM

## 2024-01-24 RX ORDER — AMLODIPINE BESYLATE 10 MG/1
10 TABLET ORAL DAILY
Status: DISCONTINUED | OUTPATIENT
Start: 2024-01-25 | End: 2024-01-26 | Stop reason: HOSPADM

## 2024-01-24 RX ORDER — PANTOPRAZOLE SODIUM 40 MG/10ML
40 INJECTION, POWDER, LYOPHILIZED, FOR SOLUTION INTRAVENOUS ONCE
Status: COMPLETED | OUTPATIENT
Start: 2024-01-24 | End: 2024-01-24

## 2024-01-24 RX ORDER — METOCLOPRAMIDE HYDROCHLORIDE 5 MG/ML
10 INJECTION INTRAMUSCULAR; INTRAVENOUS EVERY 6 HOURS PRN
Status: DISCONTINUED | OUTPATIENT
Start: 2024-01-24 | End: 2024-01-26 | Stop reason: HOSPADM

## 2024-01-24 RX ORDER — LABETALOL HYDROCHLORIDE 5 MG/ML
INJECTION, SOLUTION INTRAVENOUS AS NEEDED
Status: DISCONTINUED | OUTPATIENT
Start: 2024-01-24 | End: 2024-01-24

## 2024-01-24 RX ORDER — PROPOFOL 10 MG/ML
INJECTION, EMULSION INTRAVENOUS AS NEEDED
Status: DISCONTINUED | OUTPATIENT
Start: 2024-01-24 | End: 2024-01-24

## 2024-01-24 RX ORDER — ROCURONIUM BROMIDE 10 MG/ML
INJECTION, SOLUTION INTRAVENOUS AS NEEDED
Status: DISCONTINUED | OUTPATIENT
Start: 2024-01-24 | End: 2024-01-24

## 2024-01-24 RX ORDER — CEFAZOLIN SODIUM IN 0.9 % NACL 3 G/100 ML
3 INTRAVENOUS SOLUTION, PIGGYBACK (ML) INTRAVENOUS ONCE
Status: COMPLETED | OUTPATIENT
Start: 2024-01-24 | End: 2024-01-24

## 2024-01-24 RX ORDER — HYDRALAZINE HYDROCHLORIDE 20 MG/ML
10 INJECTION INTRAMUSCULAR; INTRAVENOUS EVERY 4 HOURS PRN
Status: DISCONTINUED | OUTPATIENT
Start: 2024-01-24 | End: 2024-01-26 | Stop reason: HOSPADM

## 2024-01-24 RX ORDER — ONDANSETRON HYDROCHLORIDE 2 MG/ML
INJECTION, SOLUTION INTRAVENOUS AS NEEDED
Status: DISCONTINUED | OUTPATIENT
Start: 2024-01-24 | End: 2024-01-24

## 2024-01-24 RX ADMIN — PROPOFOL 200 MG: 10 INJECTION, EMULSION INTRAVENOUS at 08:23

## 2024-01-24 RX ADMIN — HEPARIN SODIUM 5000 UNITS: 5000 INJECTION, SOLUTION INTRAVENOUS; SUBCUTANEOUS at 07:16

## 2024-01-24 RX ADMIN — GLYCOPYRROLATE 0.4 MG: 0.2 INJECTION INTRAMUSCULAR; INTRAVENOUS at 14:39

## 2024-01-24 RX ADMIN — LIDOCAINE HYDROCHLORIDE 5 ML: 10 INJECTION, SOLUTION EPIDURAL; INFILTRATION; INTRACAUDAL; PERINEURAL at 08:23

## 2024-01-24 RX ADMIN — HYDRALAZINE HYDROCHLORIDE 5 MG: 20 INJECTION INTRAMUSCULAR; INTRAVENOUS at 15:40

## 2024-01-24 RX ADMIN — LABETALOL HYDROCHLORIDE 2.5 MG: 5 INJECTION, SOLUTION INTRAVENOUS at 12:53

## 2024-01-24 RX ADMIN — FENTANYL CITRATE 50 MCG: 50 INJECTION, SOLUTION INTRAMUSCULAR; INTRAVENOUS at 08:48

## 2024-01-24 RX ADMIN — Medication: at 17:00

## 2024-01-24 RX ADMIN — Medication 100 MCG: at 09:29

## 2024-01-24 RX ADMIN — SCOPALAMINE 1 PATCH: 1 PATCH, EXTENDED RELEASE TRANSDERMAL at 07:15

## 2024-01-24 RX ADMIN — FENTANYL CITRATE 50 MCG: 50 INJECTION, SOLUTION INTRAMUSCULAR; INTRAVENOUS at 10:49

## 2024-01-24 RX ADMIN — METOPROLOL TARTRATE 5 MG: 5 INJECTION INTRAVENOUS at 23:18

## 2024-01-24 RX ADMIN — SODIUM CHLORIDE, SODIUM LACTATE, POTASSIUM CHLORIDE, AND CALCIUM CHLORIDE: 600; 310; 30; 20 INJECTION, SOLUTION INTRAVENOUS at 09:38

## 2024-01-24 RX ADMIN — ONDANSETRON HYDROCHLORIDE 4 MG: 2 INJECTION INTRAMUSCULAR; INTRAVENOUS at 14:37

## 2024-01-24 RX ADMIN — ROCURONIUM BROMIDE 10 MG: 10 INJECTION, SOLUTION INTRAVENOUS at 12:37

## 2024-01-24 RX ADMIN — FENTANYL CITRATE 50 MCG: 50 INJECTION, SOLUTION INTRAMUSCULAR; INTRAVENOUS at 08:52

## 2024-01-24 RX ADMIN — Medication 50 MCG: at 11:07

## 2024-01-24 RX ADMIN — MIDAZOLAM 2 MG: 1 INJECTION INTRAMUSCULAR; INTRAVENOUS at 08:18

## 2024-01-24 RX ADMIN — ROCURONIUM BROMIDE 5 MG: 10 INJECTION, SOLUTION INTRAVENOUS at 12:03

## 2024-01-24 RX ADMIN — FENTANYL CITRATE 50 MCG: 50 INJECTION, SOLUTION INTRAMUSCULAR; INTRAVENOUS at 08:23

## 2024-01-24 RX ADMIN — LABETALOL HYDROCHLORIDE 2.5 MG: 5 INJECTION, SOLUTION INTRAVENOUS at 14:17

## 2024-01-24 RX ADMIN — LABETALOL HYDROCHLORIDE 2.5 MG: 5 INJECTION, SOLUTION INTRAVENOUS at 14:13

## 2024-01-24 RX ADMIN — Medication 150 MCG: at 10:24

## 2024-01-24 RX ADMIN — LABETALOL HYDROCHLORIDE 5 MG: 5 INJECTION, SOLUTION INTRAVENOUS at 15:26

## 2024-01-24 RX ADMIN — LABETALOL HYDROCHLORIDE 5 MG: 5 INJECTION, SOLUTION INTRAVENOUS at 15:33

## 2024-01-24 RX ADMIN — Medication 100 MCG: at 14:02

## 2024-01-24 RX ADMIN — Medication 100 MCG: at 13:37

## 2024-01-24 RX ADMIN — ROCURONIUM BROMIDE 20 MG: 10 INJECTION, SOLUTION INTRAVENOUS at 11:11

## 2024-01-24 RX ADMIN — Medication 100 MCG: at 12:43

## 2024-01-24 RX ADMIN — ROCURONIUM BROMIDE 5 MG: 10 INJECTION, SOLUTION INTRAVENOUS at 14:19

## 2024-01-24 RX ADMIN — PANTOPRAZOLE SODIUM 40 MG: 40 INJECTION, POWDER, FOR SOLUTION INTRAVENOUS at 07:16

## 2024-01-24 RX ADMIN — Medication 100 MCG: at 11:42

## 2024-01-24 RX ADMIN — SODIUM CHLORIDE, SODIUM LACTATE, POTASSIUM CHLORIDE, AND CALCIUM CHLORIDE 125 ML/HR: 600; 310; 30; 20 INJECTION, SOLUTION INTRAVENOUS at 17:28

## 2024-01-24 RX ADMIN — NEOSTIGMINE METHYLSULFATE 3 MG: 1 INJECTION, SOLUTION INTRAVENOUS at 14:41

## 2024-01-24 RX ADMIN — METOCLOPRAMIDE 10 MG: 5 INJECTION, SOLUTION INTRAMUSCULAR; INTRAVENOUS at 07:16

## 2024-01-24 RX ADMIN — ROCURONIUM BROMIDE 10 MG: 10 INJECTION, SOLUTION INTRAVENOUS at 12:18

## 2024-01-24 RX ADMIN — ROCURONIUM BROMIDE 20 MG: 10 INJECTION, SOLUTION INTRAVENOUS at 09:40

## 2024-01-24 RX ADMIN — SODIUM CHLORIDE, SODIUM LACTATE, POTASSIUM CHLORIDE, AND CALCIUM CHLORIDE 100 ML/HR: 600; 310; 30; 20 INJECTION, SOLUTION INTRAVENOUS at 07:14

## 2024-01-24 RX ADMIN — Medication 50 MCG: at 11:09

## 2024-01-24 RX ADMIN — ROCURONIUM BROMIDE 50 MG: 10 INJECTION, SOLUTION INTRAVENOUS at 08:23

## 2024-01-24 RX ADMIN — FENTANYL CITRATE 50 MCG: 50 INJECTION, SOLUTION INTRAMUSCULAR; INTRAVENOUS at 10:55

## 2024-01-24 RX ADMIN — FENTANYL CITRATE 25 MCG: 50 INJECTION, SOLUTION INTRAMUSCULAR; INTRAVENOUS at 09:37

## 2024-01-24 RX ADMIN — ACETAMINOPHEN 1000 MG: 10 INJECTION INTRAVENOUS at 22:09

## 2024-01-24 RX ADMIN — ROCURONIUM BROMIDE 20 MG: 10 INJECTION, SOLUTION INTRAVENOUS at 10:45

## 2024-01-24 RX ADMIN — METOCLOPRAMIDE HYDROCHLORIDE 10 MG: 5 INJECTION INTRAMUSCULAR; INTRAVENOUS at 17:38

## 2024-01-24 RX ADMIN — ROCURONIUM BROMIDE 10 MG: 10 INJECTION, SOLUTION INTRAVENOUS at 13:37

## 2024-01-24 RX ADMIN — SUGAMMADEX 200 MG: 100 INJECTION, SOLUTION INTRAVENOUS at 15:09

## 2024-01-24 RX ADMIN — FENTANYL CITRATE 50 MCG: 50 INJECTION, SOLUTION INTRAMUSCULAR; INTRAVENOUS at 12:15

## 2024-01-24 RX ADMIN — LABETALOL HYDROCHLORIDE 5 MG: 5 INJECTION, SOLUTION INTRAVENOUS at 15:14

## 2024-01-24 RX ADMIN — NEOSTIGMINE METHYLSULFATE 2 MG: 1 INJECTION, SOLUTION INTRAVENOUS at 14:39

## 2024-01-24 RX ADMIN — FENTANYL CITRATE 50 MCG: 50 INJECTION INTRAMUSCULAR; INTRAVENOUS at 15:46

## 2024-01-24 RX ADMIN — ACETAMINOPHEN 1000 MG: 10 INJECTION INTRAVENOUS at 17:28

## 2024-01-24 RX ADMIN — ONDANSETRON 4 MG: 2 INJECTION INTRAMUSCULAR; INTRAVENOUS at 21:07

## 2024-01-24 RX ADMIN — FENTANYL CITRATE 25 MCG: 50 INJECTION, SOLUTION INTRAMUSCULAR; INTRAVENOUS at 10:36

## 2024-01-24 RX ADMIN — Medication 100 MCG: at 09:13

## 2024-01-24 RX ADMIN — ROCURONIUM BROMIDE 20 MG: 10 INJECTION, SOLUTION INTRAVENOUS at 10:08

## 2024-01-24 RX ADMIN — SODIUM CHLORIDE, SODIUM LACTATE, POTASSIUM CHLORIDE, AND CALCIUM CHLORIDE: 600; 310; 30; 20 INJECTION, SOLUTION INTRAVENOUS at 12:37

## 2024-01-24 RX ADMIN — Medication 3 L/MIN: at 17:00

## 2024-01-24 RX ADMIN — Medication 3 G: at 08:16

## 2024-01-24 RX ADMIN — INSULIN GLARGINE 20 UNITS: 100 INJECTION, SOLUTION SUBCUTANEOUS at 21:16

## 2024-01-24 RX ADMIN — ROCURONIUM BROMIDE 5 MG: 10 INJECTION, SOLUTION INTRAVENOUS at 11:48

## 2024-01-24 RX ADMIN — METOPROLOL TARTRATE 5 MG: 5 INJECTION INTRAVENOUS at 17:27

## 2024-01-24 RX ADMIN — LABETALOL HYDROCHLORIDE 2.5 MG: 5 INJECTION, SOLUTION INTRAVENOUS at 10:59

## 2024-01-24 RX ADMIN — LABETALOL HYDROCHLORIDE 2.5 MG: 5 INJECTION, SOLUTION INTRAVENOUS at 13:11

## 2024-01-24 RX ADMIN — ROCURONIUM BROMIDE 20 MG: 10 INJECTION, SOLUTION INTRAVENOUS at 09:12

## 2024-01-24 RX ADMIN — GLYCOPYRROLATE 0.6 MG: 0.2 INJECTION INTRAMUSCULAR; INTRAVENOUS at 14:41

## 2024-01-24 RX ADMIN — LABETALOL HYDROCHLORIDE 5 MG: 5 INJECTION, SOLUTION INTRAVENOUS at 15:19

## 2024-01-24 RX ADMIN — DEXAMETHASONE SODIUM PHOSPHATE 4 MG: 4 INJECTION, SOLUTION INTRA-ARTICULAR; INTRALESIONAL; INTRAMUSCULAR; INTRAVENOUS; SOFT TISSUE at 08:44

## 2024-01-24 RX ADMIN — ROCURONIUM BROMIDE 20 MG: 10 INJECTION, SOLUTION INTRAVENOUS at 12:55

## 2024-01-24 RX ADMIN — ACETAMINOPHEN 1000 MG: 10 INJECTION INTRAVENOUS at 07:15

## 2024-01-24 RX ADMIN — LABETALOL HYDROCHLORIDE 2.5 MG: 5 INJECTION, SOLUTION INTRAVENOUS at 12:14

## 2024-01-24 RX ADMIN — Medication 3 G: at 12:11

## 2024-01-24 RX ADMIN — HYDRALAZINE HYDROCHLORIDE 10 MG: 20 INJECTION INTRAMUSCULAR; INTRAVENOUS at 20:14

## 2024-01-24 RX ADMIN — Medication 100 MCG: at 09:47

## 2024-01-24 RX ADMIN — Medication 50 MCG: at 11:17

## 2024-01-24 RX ADMIN — INSULIN LISPRO 2 UNITS: 100 INJECTION, SOLUTION INTRAVENOUS; SUBCUTANEOUS at 21:25

## 2024-01-24 RX ADMIN — ROCURONIUM BROMIDE 20 MG: 10 INJECTION, SOLUTION INTRAVENOUS at 08:47

## 2024-01-24 RX ADMIN — Medication 100 MCG: at 13:19

## 2024-01-24 RX ADMIN — Medication 100 MCG: at 14:36

## 2024-01-24 RX ADMIN — FENTANYL CITRATE 50 MCG: 50 INJECTION, SOLUTION INTRAMUSCULAR; INTRAVENOUS at 12:54

## 2024-01-24 SDOH — SOCIAL STABILITY: SOCIAL INSECURITY: HAVE YOU HAD THOUGHTS OF HARMING ANYONE ELSE?: NO

## 2024-01-24 SDOH — SOCIAL STABILITY: SOCIAL INSECURITY: DOES ANYONE TRY TO KEEP YOU FROM HAVING/CONTACTING OTHER FRIENDS OR DOING THINGS OUTSIDE YOUR HOME?: NO

## 2024-01-24 SDOH — SOCIAL STABILITY: SOCIAL INSECURITY: WERE YOU ABLE TO COMPLETE ALL THE BEHAVIORAL HEALTH SCREENINGS?: YES

## 2024-01-24 SDOH — SOCIAL STABILITY: SOCIAL INSECURITY: HAS ANYONE EVER THREATENED TO HURT YOUR FAMILY OR YOUR PETS?: NO

## 2024-01-24 SDOH — SOCIAL STABILITY: SOCIAL INSECURITY: ARE YOU OR HAVE YOU BEEN THREATENED OR ABUSED PHYSICALLY, EMOTIONALLY, OR SEXUALLY BY ANYONE?: NO

## 2024-01-24 SDOH — SOCIAL STABILITY: SOCIAL INSECURITY: ARE THERE ANY APPARENT SIGNS OF INJURIES/BEHAVIORS THAT COULD BE RELATED TO ABUSE/NEGLECT?: NO

## 2024-01-24 SDOH — SOCIAL STABILITY: SOCIAL INSECURITY: DO YOU FEEL ANYONE HAS EXPLOITED OR TAKEN ADVANTAGE OF YOU FINANCIALLY OR OF YOUR PERSONAL PROPERTY?: NO

## 2024-01-24 SDOH — HEALTH STABILITY: MENTAL HEALTH: CURRENT SMOKER: 0

## 2024-01-24 SDOH — SOCIAL STABILITY: SOCIAL INSECURITY: DO YOU FEEL UNSAFE GOING BACK TO THE PLACE WHERE YOU ARE LIVING?: NO

## 2024-01-24 SDOH — SOCIAL STABILITY: SOCIAL INSECURITY: ABUSE: ADULT

## 2024-01-24 ASSESSMENT — PAIN - FUNCTIONAL ASSESSMENT
PAIN_FUNCTIONAL_ASSESSMENT: CPOT (CRITICAL CARE PAIN OBSERVATION TOOL)
PAIN_FUNCTIONAL_ASSESSMENT: 0-10
PAIN_FUNCTIONAL_ASSESSMENT: CPOT (CRITICAL CARE PAIN OBSERVATION TOOL)
PAIN_FUNCTIONAL_ASSESSMENT: 0-10
PAIN_FUNCTIONAL_ASSESSMENT: CPOT (CRITICAL CARE PAIN OBSERVATION TOOL)

## 2024-01-24 ASSESSMENT — LIFESTYLE VARIABLES
AUDIT-C TOTAL SCORE: 0
SKIP TO QUESTIONS 9-10: 1
HOW MANY STANDARD DRINKS CONTAINING ALCOHOL DO YOU HAVE ON A TYPICAL DAY: PATIENT DOES NOT DRINK
HOW OFTEN DO YOU HAVE A DRINK CONTAINING ALCOHOL: NEVER
HOW OFTEN DO YOU HAVE 6 OR MORE DRINKS ON ONE OCCASION: NEVER
AUDIT-C TOTAL SCORE: 0

## 2024-01-24 ASSESSMENT — COGNITIVE AND FUNCTIONAL STATUS - GENERAL
DAILY ACTIVITIY SCORE: 21
PATIENT BASELINE BEDBOUND: NO
DRESSING REGULAR LOWER BODY CLOTHING: A LITTLE
TOILETING: A LITTLE
WALKING IN HOSPITAL ROOM: A LITTLE
HELP NEEDED FOR BATHING: A LITTLE
MOBILITY SCORE: 22
CLIMB 3 TO 5 STEPS WITH RAILING: A LITTLE

## 2024-01-24 ASSESSMENT — ENCOUNTER SYMPTOMS
CONSTIPATION: 0
DIFFICULTY URINATING: 0
DIARRHEA: 0
PALPITATIONS: 0
SHORTNESS OF BREATH: 0
FEVER: 0
ABDOMINAL PAIN: 0
DIZZINESS: 0
FATIGUE: 0
SORE THROAT: 0
CHILLS: 0
NAUSEA: 0
SLEEP DISTURBANCE: 0
WEAKNESS: 0
SINUS PAIN: 0
RHINORRHEA: 0
NERVOUS/ANXIOUS: 0
FREQUENCY: 0
JOINT SWELLING: 0
HEMATURIA: 0
APPETITE CHANGE: 0
ARTHRALGIAS: 0
VOMITING: 0
COUGH: 0
HEADACHES: 0

## 2024-01-24 ASSESSMENT — ACTIVITIES OF DAILY LIVING (ADL)
ADEQUATE_TO_COMPLETE_ADL: YES
HEARING - LEFT EAR: FUNCTIONAL
PATIENT'S MEMORY ADEQUATE TO SAFELY COMPLETE DAILY ACTIVITIES?: YES
TOILETING: INDEPENDENT
JUDGMENT_ADEQUATE_SAFELY_COMPLETE_DAILY_ACTIVITIES: YES
HEARING - RIGHT EAR: FUNCTIONAL
GROOMING: INDEPENDENT
DRESSING YOURSELF: INDEPENDENT
BATHING: INDEPENDENT
FEEDING YOURSELF: INDEPENDENT
LACK_OF_TRANSPORTATION: PATIENT UNABLE TO ANSWER
WALKS IN HOME: INDEPENDENT

## 2024-01-24 ASSESSMENT — COLUMBIA-SUICIDE SEVERITY RATING SCALE - C-SSRS
2. HAVE YOU ACTUALLY HAD ANY THOUGHTS OF KILLING YOURSELF?: NO
1. IN THE PAST MONTH, HAVE YOU WISHED YOU WERE DEAD OR WISHED YOU COULD GO TO SLEEP AND NOT WAKE UP?: NO
6. HAVE YOU EVER DONE ANYTHING, STARTED TO DO ANYTHING, OR PREPARED TO DO ANYTHING TO END YOUR LIFE?: NO

## 2024-01-24 ASSESSMENT — PAIN SCALES - GENERAL
PAINLEVEL_OUTOF10: 8
PAINLEVEL_OUTOF10: 0 - NO PAIN

## 2024-01-24 ASSESSMENT — PATIENT HEALTH QUESTIONNAIRE - PHQ9
1. LITTLE INTEREST OR PLEASURE IN DOING THINGS: NOT AT ALL
2. FEELING DOWN, DEPRESSED OR HOPELESS: NOT AT ALL
SUM OF ALL RESPONSES TO PHQ9 QUESTIONS 1 & 2: 0

## 2024-01-24 NOTE — ANESTHESIA PROCEDURE NOTES
Airway  Date/Time: 1/24/2024 8:24 AM  Urgency: elective    Airway not difficult    Staffing  Performed: PORSHA   Authorized by: Radames Lees DO    Performed by: PORSHA Finley  Patient location during procedure: OR    Indications and Patient Condition  Indications for airway management: anesthesia  Spontaneous Ventilation: absent  Sedation level: deep  Preoxygenated: yes  Patient position: sniffing  Mask difficulty assessment: 1 - vent by mask  Planned trial extubation    Final Airway Details  Final airway type: endotracheal airway      Successful airway: ETT     Successful intubation technique: direct laryngoscopy  Facilitating devices/methods: intubating stylet  Endotracheal tube insertion site: oral  Blade: Gloria  Blade size: #3  ETT size (mm): 7.5  Cormack-Lehane Classification: grade I - full view of glottis  Placement verified by: capnometry   Measured from: lips  ETT to lips (cm): 23  Number of attempts at approach: 1

## 2024-01-24 NOTE — CONSULTS
ConsultsReason For Consult  HTN, DM, sleep apnea    History Of Present Illness  Reshma Francisco is a 48 y.o. female presenting for bariatric surgery. Denies SOB, nausea, epigastric pain tolerable  Past Medical History  She has a past medical history of Depression, DM (diabetes mellitus) (CMS/HCC), and HTN (hypertension).    Surgical History  She has a past surgical history that includes Hysterectomy (2018) and Cyst Removal (2021).     Social History  She reports that she has never smoked. She has never been exposed to tobacco smoke. She has never used smokeless tobacco. She reports that she does not currently use alcohol. She reports that she does not use drugs.    Family History  Family History   Problem Relation Name Age of Onset    Other (thyroid growth) Mother      Throat cancer Mother      Other (morbid obesity) Mother      Other (pacemaker) Father      Other (blood clots after pneumonia) Father      Pulmonary embolism Other Grandfather 80        on blood thinner for 6 months,        Allergies  Shellfish containing products and Lovastatin    Review of Systems    Review of Systems   Constitutional:  Negative for appetite change, chills, fatigue and fever.   HENT:  Negative for ear pain, rhinorrhea, sinus pain and sore throat.    Eyes:  Negative for visual disturbance.   Respiratory:  Negative for cough and shortness of breath.    Cardiovascular:  Negative for chest pain and palpitations.   Gastrointestinal:  Negative for abdominal pain, constipation, diarrhea, nausea and vomiting.   Genitourinary:  Negative for difficulty urinating, frequency and hematuria.   Musculoskeletal:  Negative for arthralgias and joint swelling.   Skin:  Negative for rash.   Neurological:  Negative for dizziness, weakness and headaches.   Psychiatric/Behavioral:  Negative for sleep disturbance. The patient is not nervous/anxious.          Physical Exam  Physical Exam  Constitutional:       General: She is not in acute distress.      Appearance: She is obese.   HENT:      Mouth/Throat:      Comments: Dentition WNL  Cardiovascular:      Rate and Rhythm: Normal rate and regular rhythm.      Heart sounds: Normal heart sounds.   Pulmonary:      Breath sounds: Normal breath sounds.   Abdominal:      General: Bowel sounds are normal.      Palpations: Abdomen is soft.      Tenderness: There is no abdominal tenderness.   Musculoskeletal:         General: Normal range of motion.      Cervical back: Neck supple. No tenderness.      Right lower leg: No edema.      Left lower leg: No edema.   Lymphadenopathy:      Cervical: No cervical adenopathy.   Skin:     General: Skin is warm.      Findings: No erythema.   Neurological:      General: No focal deficit present.      Mental Status: She is alert and oriented to person, place, and time.      Gait: Gait is intact.   Psychiatric:         Mood and Affect: Mood normal.         Behavior: Behavior normal.              Assessment and plan  HTN- will monitor BP  DM- will monitor blood glucose  Sleep apnea- will use CPAP

## 2024-01-24 NOTE — ANESTHESIA PREPROCEDURE EVALUATION
Patient: Reshma Francisco    Procedure Information       Date/Time: 01/24/24 0800    Procedure: Gastric Bypass Laparoscopic  **PAT ON ADMIT**    Location: [facility] OR  / [facility]    Surgeons: Mike Luke MD            Relevant Problems   Anesthesia (within normal limits)      Cardiovascular  EF 40%, Stress test last year that showed no ischemia, however has signs of previous scarring.  No symptoms of CHF. Cleared by Dr Smith   (+) CHF (congestive heart failure) (CMS/HCC)   (+) Essential hypertension   (+) Hyperlipidemia      Endocrine   (+) Morbid obesity (CMS/HCC)   (+) Obesity, morbid (CMS/HCC)      GI   (+) Gastro-esophageal reflux disease without esophagitis      Neuro/Psych   (+) Anxiety      Pulmonary   (+) Obstructive sleep apnea      Musculoskeletal   (+) Idiopathic osteoarthritis       Clinical information reviewed:    Allergies  Meds               NPO Detail:  NPO/Void Status  Date of Last Liquid: 01/23/24  Time of Last Liquid: 2100  Date of Last Solid: 01/10/24  Last Intake Type: Clear fluids  Time of Last Void: 0600         Physical Exam    Airway  Mallampati: III  TM distance: >3 FB  Neck ROM: full     Cardiovascular   Comments: Deferred   Dental   Comments: No loose teeth   Pulmonary   Comments: Deferred   Abdominal     Comments: Deferred           Anesthesia Plan    History of general anesthesia?: yes  History of complications of general anesthesia?: no    ASA 3     general     The patient is not a current smoker.  Patient was not previously instructed to abstain from smoking on day of procedure.  Patient did not smoke on day of procedure.  Education provided regarding risk of obstructive sleep apnea.  intravenous induction   Postoperative administration of opioids is intended.  Anesthetic plan and risks discussed with patient.  Use of blood products discussed with patient who consented to blood products.    Plan discussed with CRNA and CAA.

## 2024-01-24 NOTE — ANESTHESIA PROCEDURE NOTES
Peripheral IV  Date/Time: 1/24/2024 8:26 AM  Inserted by: Radames Lees DO    Placement  Needle size: 18 G  Laterality: right  Location: wrist  Local anesthetic: none  Site prep: alcohol  Technique: anatomical landmarks  Attempts: 3

## 2024-01-24 NOTE — OP NOTE
Gastric Bypass Laparoscopic   **PAT ON ADMIT** Operative Note     Date: 2024  OR Location: [facility]    Name: Reshma Francisco : 1975, Age: 48 y.o., MRN: [id number], Sex: female    Diagnosis  Pre-op Diagnosis     * Obesity, morbid (CMS/HCC) [E66.01]     * Obstructive sleep apnea [G47.33]     * Type 2 diabetes mellitus treated without insulin (CMS/HCC) [E11.9]     * Primary hypertension [I10]     * Hypercholesterolemia [E78.00]     * Metabolic syndrome [E88.810]     * Gastroesophageal reflux disease with esophagitis without hemorrhage [K21.00] Post-op Diagnosis     * Obesity, morbid (CMS/HCC) [E66.01]     * Obstructive sleep apnea [G47.33]     * Type 2 diabetes mellitus treated without insulin (CMS/HCC) [E11.9]     * Primary hypertension [I10]     * Hypercholesterolemia [E78.00]     * Metabolic syndrome [E88.810]     * Hiatal hernia [K44.9]     * Gastroesophageal reflux disease with esophagitis without hemorrhage [K21.00]     Procedures  Gastric Bypass Laparoscopic   **PAT ON ADMIT**  [id number] - MD LAPS GSTR RSTCV PX W/BYP FERNANDA-EN-Y LIMB <150 CM      Surgeons      * Mike Luke - Primary    Resident/Fellow/Other Assistant:  Surgeon(s) and Role:    Procedure Summary  Anesthesia: General  ASA: III  Anesthesia Staff: Anesthesiologist: Radames Lees DO; Shant Henry MD  CRNA: STONE Camp-CRNA  C-AA: PORSHA Finley  Estimated Blood Loss: 0mL  Intra-op Medications:   Administrations occurring from 0800 to 1215 on 24:   Medication Name Total Dose   lactated Ringer's infusion 261.67 mL   ceFAZolin in 0.9% sod chloride (Ancef) IVPB 3 g 6 g              Anesthesia Record               Intraprocedure I/O Totals          Intake    lactated Ringer's infusion 2600.00 mL    Total Intake 2600 mL          Specimen: No specimens collected     Staff:   Circulator: Mary Ellen De La Cruz RN  Relief Circulator: Jazmine Santillan RN  Relief Scrub: Mariam Ley  Scrub Person: Yanelis  [place]  [facility]: Mile Brewer         Findings: no leak, no obstruction, hiatal hernia    Indications: Reshma Francisco is an 48 y.o. woman that weighs 304 pounds and has a body mass index of 49.  She suffers from morbid obesity, obstructive sleep apnea, type 2 diabetes, hypertension, hypercholesterolemia, gastroesophageal reflux disease with a history of esophageal stricture.  She has failed maximal medical attempts weight loss prompting referral for surgical intervention.  She was to proceed with Mariposa-en-Y gastric bypass.  We discussed the possibility of intraoperative finding of hiatal hernia and that it would be repaired at the time of surgery.  We discussed the operative risks including death, venous thromboembolic event, GI tract injury or leak, bleeding, inadequate weight loss, gastrojejunal ulcer, small bowel obstruction, need for reoperation, nonresolution of comorbidities, need for lifelong supplementation and follow-up.  Consent obtained.  Reshma and her sister were seen in the preoperative area. The risks, benefits, complications, treatment options, non-operative alternatives, expected recovery and outcomes were discussed with the patient. The possibilities of reaction to medication, pulmonary aspiration, injury to surrounding structures, bleeding, recurrent infection, the need for additional procedures, failure to diagnose a condition, and creating a complication requiring transfusion or operation were discussed with the patient. The patient concurred with the proposed plan, giving informed consent.  The site of surgery was properly noted/marked if necessary per policy. The patient has been actively warmed in preoperative area. Preoperative antibiotics have been ordered and given within 1 hours of incision. Venous thrombosis prophylaxis have been ordered including bilateral sequential compression devices and chemical prophylaxis    Procedure Details: Reshma was given antibiotics and subcutaneous heparin  in the preoperative holding area.  She was brought to the operating room where preoperative huddle was done.  SCDs were placed.  She was then given IV sedation and intubated.  She was placed in split position.  Her abdomen is prepped and draped in a sterile fashion.  I entered the abdomen in the left midclavicular line with a 0 degree scope and a 12 mm nonbladed trocar.  I insufflated the abdomen and switched an angled scope.  There was no trauma from entry.  I placed a remainder my trocars and my subxiphoid Dalia liver retractor.  She had massive hepatomegaly consistent with her metabolic syndrome.  On lifting up the liver she had an obvious hiatal hernia with a significant fat pad at the GE junction.  I excised the fat pad.  I opened the peritoneum along the angle of Hiss.  I opened the peritoneum along the avascular plane atop the caudate.  I incised the peritoneum along the right bryon and carried the dissection anteriorly.  I swept the esophagus and posterior vagus off the right bryon.  Identified aorta.  I swept the esophagus and posterior vagus off the aorta until I defined the left bryon.  I made a retroesophageal window and brought a Penrose through it.  There is a Penrose for traction.  I circumferentially mobilized the crura.  I then did a mediastinal dissection freeing up the esophagus from the aorta posteriorly, the pleura bilaterally and the pericardium anteriorly until the gastroesophageal GE junction was in the peritoneal cavity without any tension on the Penrose.  I then opened the gastrocolic ligament and freed up the retrogastric adhesions.  There were significant number of adhesions.  I then flatten the patient paddled up the omentum and grasped the [place] colon.  I made a window in the mesocolon above the ligament of Treitz.  I then measure down from ligament of Treitz on the jejunum about 60 cm.  I divided the small bowel with a blue load Endo HERNANDEZ.  I divided small bowel mesentery taking care  not to devascularize either end of bowel.  I measured 100 cm.  I placed a stay suture of 2-0 Vicryl from the Mariposa limb to the biliary limb.  I made enterotomies opposite each other.  I fired a 45 mm white load Endo HERNANDEZ crating and enteroenterostomy.  I closed the common enterotomy with a 2-0 Vicryl.  I closed the mesenteric defect with a running locked 2 Ethibond.  I brought small bowel through the mesocolic window.  Taking care not to disoriented.  I then made a window along the lesser curvature the stomach about 3 to 4 cm below the angle of Hiss.  I had anesthesia pass a 34 Kazakh bougie.  I fired a gold load Endo HERNANDEZ crating horizontal staple line.  I used 2 firings of the gold load Endo HERNANDEZ to divide the pouch from the excluded stomach.  I used clips to control the bleeding along the staple line.  I then reintroduced my Penrose and repaired the crura posterior to the esophagus with interrupted 0 Ethibond sutures.  I ran a back row of 2-0 Vicryl from the antimesenteric border of the Mariposa limb to the posterior aspect of the pouch in a retrocolic retrogastric fashion.  This was to reduce the tension as she had a long trunk and a lot of intra-abdominal adipose.  I then fired a blue load Endo HERNANDEZ for distance of 2 cm.  I closed the common enterotomy with a 2-0 Vicryl starting either end meeting in the middle.  I had anesthesia pass an 18 Kazakh orogastric tube through the anastomosis.  I ran an outer row of 2-0 Vicryl completely imbricating the anastomosis.  I then put a bowel clamp on the Mariposa limb distal to the orogastric tube.  I flatten the patient's emergency anastomosis and performed an air leak test.  There was no air leak with excellent distention.  I had them remove the air and then the tube.  I took off the clamp.  There was no trauma from the clamp.  I sucked out saline.  I then flatten the patient paddled up the omentum grasped the [place] colon, pulled on the excess bowel.  I closed the Petersons defect  with a running locked 2 Ethibond.  I placed 4 interrupted 2-0 Ethibond sutures from the small bowel to the [place] colon.  I ran the small bowel from the mesocolic window to the enteroenterostomy.  There was no tension or kinks.  I then put the patient back in reverse Trendelenburg and paddled on the omentum.  There was no tension on the Mariposa limb.  I put 250 cc of fibrin sealant on the gastrojejunostomy.  I let the cure.  I then removed my subxiphoid liver retractor.  I closed the 2 inferior fascial defect with 0 Vicryl using a fascial closure device.  I removed the remaining trocars under direct vision.  There was no bleeding.  I irrigated subcutaneous tissue.  I closed the skin with 4-0 undyed Monocryl.  I placed Dermabond.  The patient's anesthetic was reversed, she was extubated and brought to the recovery in stable condition.  All counts were correct.  She tolerated the procedure well.  She was placed on CPAP in the recovery room per protocol for patients with sleep apnea.  Complications:  None; patient tolerated the procedure well.    Disposition: PACU - hemodynamically stable.  Condition: stable             Mike Luke  Phone Number: [phone]

## 2024-01-24 NOTE — ANESTHESIA POSTPROCEDURE EVALUATION
Patient: Reshma Francisco    Procedure Summary       Date: 01/24/24 Room / Location: [facility] OR 11 / [facility]    Anesthesia Start: 0810 Anesthesia Stop: 1512    Procedure: Gastric Bypass Laparoscopic   **PAT ON ADMIT** (Abdomen) Diagnosis:       Obesity, morbid (CMS/[facility])      Obstructive sleep apnea      Type 2 diabetes mellitus treated without insulin (CMS/[facility])      Primary hypertension      Hypercholesterolemia      Metabolic syndrome      Hiatal hernia      Gastroesophageal reflux disease with esophagitis without hemorrhage      (Obesity, morbid (CMS/[facility]) [E66.01])    Surgeons: Mike Luke MD Responsible Provider: PORSHA Finley    Anesthesia Type: general ASA Status: 3            Anesthesia Type: general    Vitals Value Taken Time   /95 01/24/24 1512   Temp 36.5 °C (97.7 °F) 01/24/24 1505   Pulse 94 01/24/24 1515   Resp 26 01/24/24 1515   SpO2 94 % 01/24/24 1515   Vitals shown include unvalidated device data.    Anesthesia Post Evaluation    Patient location during evaluation: PACU  Patient participation: complete - patient participated  Level of consciousness: awake  Pain management: adequate  Airway patency: patent  Cardiovascular status: acceptable  Respiratory status: acceptable  Hydration status: acceptable  Postoperative Nausea and Vomiting: none        There were no known notable events for this encounter.

## 2024-01-25 ENCOUNTER — APPOINTMENT (OUTPATIENT)
Dept: RADIOLOGY | Facility: HOSPITAL | Age: 49
DRG: 620 | End: 2024-01-25
Payer: COMMERCIAL

## 2024-01-25 LAB
BASOPHILS # BLD AUTO: 0.02 X10*3/UL (ref 0–0.1)
BASOPHILS NFR BLD AUTO: 0.2 %
EOSINOPHIL # BLD AUTO: 0 X10*3/UL (ref 0–0.7)
EOSINOPHIL NFR BLD AUTO: 0 %
ERYTHROCYTE [DISTWIDTH] IN BLOOD BY AUTOMATED COUNT: 12.4 % (ref 11.5–14.5)
GLUCOSE BLD MANUAL STRIP-MCNC: 107 MG/DL (ref 74–99)
GLUCOSE BLD MANUAL STRIP-MCNC: 107 MG/DL (ref 74–99)
GLUCOSE BLD MANUAL STRIP-MCNC: 122 MG/DL (ref 74–99)
GLUCOSE BLD MANUAL STRIP-MCNC: 126 MG/DL (ref 74–99)
GLUCOSE BLD MANUAL STRIP-MCNC: 130 MG/DL (ref 74–99)
GLUCOSE BLD MANUAL STRIP-MCNC: 135 MG/DL (ref 74–99)
HCT VFR BLD AUTO: 34.6 % (ref 36–46)
HGB BLD-MCNC: 11.1 G/DL (ref 12–16)
HOLD SPECIMEN: NORMAL
IMM GRANULOCYTES # BLD AUTO: 0.04 X10*3/UL (ref 0–0.7)
IMM GRANULOCYTES NFR BLD AUTO: 0.4 % (ref 0–0.9)
LYMPHOCYTES # BLD AUTO: 1.6 X10*3/UL (ref 1.2–4.8)
LYMPHOCYTES NFR BLD AUTO: 14.2 %
MCH RBC QN AUTO: 30.6 PG (ref 26–34)
MCHC RBC AUTO-ENTMCNC: 32.1 G/DL (ref 32–36)
MCV RBC AUTO: 95 FL (ref 80–100)
MONOCYTES # BLD AUTO: 1.06 X10*3/UL (ref 0.1–1)
MONOCYTES NFR BLD AUTO: 9.4 %
NEUTROPHILS # BLD AUTO: 8.53 X10*3/UL (ref 1.2–7.7)
NEUTROPHILS NFR BLD AUTO: 75.8 %
NRBC BLD-RTO: 0 /100 WBCS (ref 0–0)
PLATELET # BLD AUTO: 278 X10*3/UL (ref 150–450)
RBC # BLD AUTO: 3.63 X10*6/UL (ref 4–5.2)
WBC # BLD AUTO: 11.3 X10*3/UL (ref 4.4–11.3)

## 2024-01-25 PROCEDURE — RXMED WILLOW AMBULATORY MEDICATION CHARGE

## 2024-01-25 PROCEDURE — 5A09357 ASSISTANCE WITH RESPIRATORY VENTILATION, LESS THAN 24 CONSECUTIVE HOURS, CONTINUOUS POSITIVE AIRWAY PRESSURE: ICD-10-PCS | Performed by: SURGERY

## 2024-01-25 PROCEDURE — 2500000005 HC RX 250 GENERAL PHARMACY W/O HCPCS: Performed by: INTERNAL MEDICINE

## 2024-01-25 PROCEDURE — 2500000002 HC RX 250 W HCPCS SELF ADMINISTERED DRUGS (ALT 637 FOR MEDICARE OP, ALT 636 FOR OP/ED): Performed by: INTERNAL MEDICINE

## 2024-01-25 PROCEDURE — 2500000004 HC RX 250 GENERAL PHARMACY W/ HCPCS (ALT 636 FOR OP/ED): Performed by: INTERNAL MEDICINE

## 2024-01-25 PROCEDURE — 99232 SBSQ HOSP IP/OBS MODERATE 35: CPT | Performed by: INTERNAL MEDICINE

## 2024-01-25 PROCEDURE — 2500000004 HC RX 250 GENERAL PHARMACY W/ HCPCS (ALT 636 FOR OP/ED): Performed by: SURGERY

## 2024-01-25 PROCEDURE — 82947 ASSAY GLUCOSE BLOOD QUANT: CPT

## 2024-01-25 PROCEDURE — 85025 COMPLETE CBC W/AUTO DIFF WBC: CPT | Performed by: SURGERY

## 2024-01-25 PROCEDURE — C9113 INJ PANTOPRAZOLE SODIUM, VIA: HCPCS | Performed by: SURGERY

## 2024-01-25 PROCEDURE — 74240 X-RAY XM UPR GI TRC 1CNTRST: CPT

## 2024-01-25 PROCEDURE — 2550000001 HC RX 255 CONTRASTS: Performed by: SURGERY

## 2024-01-25 PROCEDURE — 36415 COLL VENOUS BLD VENIPUNCTURE: CPT | Performed by: SURGERY

## 2024-01-25 PROCEDURE — 2500000001 HC RX 250 WO HCPCS SELF ADMINISTERED DRUGS (ALT 637 FOR MEDICARE OP): Performed by: INTERNAL MEDICINE

## 2024-01-25 PROCEDURE — 1200000002 HC GENERAL ROOM WITH TELEMETRY DAILY

## 2024-01-25 RX ORDER — CLOPIDOGREL BISULFATE 75 MG/1
75 TABLET ORAL DAILY
Qty: 30 TABLET | Refills: 3 | Status: SHIPPED | OUTPATIENT
Start: 2024-01-25 | End: 2024-03-11 | Stop reason: ALTCHOICE

## 2024-01-25 RX ADMIN — ACETAMINOPHEN 1000 MG: 10 INJECTION INTRAVENOUS at 11:13

## 2024-01-25 RX ADMIN — AMLODIPINE BESYLATE 10 MG: 10 TABLET ORAL at 09:00

## 2024-01-25 RX ADMIN — METOPROLOL TARTRATE 5 MG: 5 INJECTION INTRAVENOUS at 05:10

## 2024-01-25 RX ADMIN — LISINOPRIL 40 MG: 40 TABLET ORAL at 09:00

## 2024-01-25 RX ADMIN — HYDRALAZINE HYDROCHLORIDE 10 MG: 20 INJECTION INTRAMUSCULAR; INTRAVENOUS at 13:41

## 2024-01-25 RX ADMIN — ENOXAPARIN SODIUM 40 MG: 40 INJECTION SUBCUTANEOUS at 05:10

## 2024-01-25 RX ADMIN — PANTOPRAZOLE SODIUM 40 MG: 40 INJECTION, POWDER, FOR SOLUTION INTRAVENOUS at 05:10

## 2024-01-25 RX ADMIN — INSULIN GLARGINE 20 UNITS: 100 INJECTION, SOLUTION SUBCUTANEOUS at 22:10

## 2024-01-25 RX ADMIN — HYDRALAZINE HYDROCHLORIDE 10 MG: 20 INJECTION INTRAMUSCULAR; INTRAVENOUS at 22:14

## 2024-01-25 RX ADMIN — ACETAMINOPHEN 1000 MG: 10 INJECTION INTRAVENOUS at 04:53

## 2024-01-25 RX ADMIN — IOHEXOL 22.5 ML: 350 INJECTION, SOLUTION INTRAVENOUS at 13:17

## 2024-01-25 RX ADMIN — ONDANSETRON 4 MG: 2 INJECTION INTRAMUSCULAR; INTRAVENOUS at 05:10

## 2024-01-25 RX ADMIN — ACETAMINOPHEN 1000 MG: 10 INJECTION INTRAVENOUS at 22:14

## 2024-01-25 RX ADMIN — HYDRALAZINE HYDROCHLORIDE 10 MG: 20 INJECTION INTRAMUSCULAR; INTRAVENOUS at 05:10

## 2024-01-25 RX ADMIN — METOPROLOL TARTRATE 5 MG: 5 INJECTION INTRAVENOUS at 11:41

## 2024-01-25 RX ADMIN — SODIUM CHLORIDE, SODIUM LACTATE, POTASSIUM CHLORIDE, AND CALCIUM CHLORIDE 125 ML/HR: 600; 310; 30; 20 INJECTION, SOLUTION INTRAVENOUS at 13:19

## 2024-01-25 ASSESSMENT — ENCOUNTER SYMPTOMS
RESPIRATORY NEGATIVE: 1
EYES NEGATIVE: 1
CARDIOVASCULAR NEGATIVE: 1
ALLERGIC/IMMUNOLOGIC NEGATIVE: 1
HEMATOLOGIC/LYMPHATIC NEGATIVE: 1
MUSCULOSKELETAL NEGATIVE: 1
CONSTITUTIONAL NEGATIVE: 1
PSYCHIATRIC NEGATIVE: 1
NEUROLOGICAL NEGATIVE: 1
GASTROINTESTINAL NEGATIVE: 1
ENDOCRINE NEGATIVE: 1

## 2024-01-25 ASSESSMENT — COGNITIVE AND FUNCTIONAL STATUS - GENERAL
WALKING IN HOSPITAL ROOM: A LITTLE
DAILY ACTIVITIY SCORE: 21
CLIMB 3 TO 5 STEPS WITH RAILING: A LITTLE
HELP NEEDED FOR BATHING: A LITTLE
MOBILITY SCORE: 22
TOILETING: A LITTLE
DRESSING REGULAR LOWER BODY CLOTHING: A LITTLE

## 2024-01-25 ASSESSMENT — PAIN SCALES - WONG BAKER
WONGBAKER_NUMERICALRESPONSE: HURTS LITTLE BIT
WONGBAKER_NUMERICALRESPONSE: NO HURT

## 2024-01-25 ASSESSMENT — PAIN SCALES - GENERAL
PAINLEVEL_OUTOF10: 0 - NO PAIN

## 2024-01-25 NOTE — PROGRESS NOTES
MSW went to see pt 2x to complete initial assessment. Pt has been SARA for testing. Rn reporting that pt is ambulating independently. No anticipated dc needs. TCC will continue to follow.    Safe dc plan secured home.     Abigail LARAA, LSW

## 2024-01-25 NOTE — CONSULTS
Consults    Reason For Consult  Obstructive sleep apnea    History Of Present Illness  Reshma Francisco is a 48 y.o. female presenting who is postop day #1 after laparoscopic gastric bypass with Dr. Luke.  She has a known history of obstructive sleep apnea and is compliant with CPAP nightly.  We were asked to see the patient in this regard.     Past Medical History  Past Medical History:   Diagnosis Date    Depression     DM (diabetes mellitus) (CMS/HCC)     HTN (hypertension)        Surgical History  Past Surgical History:   Procedure Laterality Date    CYST REMOVAL  2021    states between breasts    HYSTERECTOMY  2018        Social History  Social History     Tobacco Use    Smoking status: Never     Passive exposure: Never    Smokeless tobacco: Never   Vaping Use    Vaping Use: Never used   Substance Use Topics    Alcohol use: Not Currently    Drug use: Never       Family History  Family History   Problem Relation Name Age of Onset    Other (thyroid growth) Mother      Throat cancer Mother      Other (morbid obesity) Mother      Other (pacemaker) Father      Other (blood clots after pneumonia) Father      Pulmonary embolism Other Grandfather 80        on blood thinner for 6 months,        Allergies  Shellfish containing products and Lovastatin    Review of Systems   Constitutional: Negative.    HENT: Negative.     Eyes: Negative.    Respiratory: Negative.     Cardiovascular: Negative.    Gastrointestinal: Negative.    Endocrine: Negative.    Genitourinary: Negative.    Musculoskeletal: Negative.    Allergic/Immunologic: Negative.    Neurological: Negative.    Hematological: Negative.    Psychiatric/Behavioral: Negative.       Subjective: Sitting up in chair.  On room air; O2 sats 98%.  No respiratory complaints.  Denies pain.  Afebrile.    Physical Exam: General: Awake and alert, no acute distress.  Pulmonary: Respirations even and unlabored.  Cardiac: Heart rate and rhythm regular.  Neurologic: Oriented to  person, place, date/time.         Vital Signs  Blood pressure 126/65, pulse 65, temperature 36.9 °C (98.4 °F), temperature source Oral, resp. rate 17, weight 136 kg (300 lb 11.3 oz), SpO2 98 %.  Oxygen Therapy  SpO2: 98 %  Medical Gas Therapy: None (Room air)  O2 Delivery Method: CPAP/Bi-PAP mask (3L bleed into HomeCPAP)       Relevant Results  No results found for this or any previous visit from the past 365 days.    Impression  Status post laparoscopic gastric bypass  Obstructive sleep apnea  Morbid obesity    Plan  Stable on room air  CPAP nightly  Continuous pulse oximetry  Incentive spirometry/pulmonary hygiene  PT/OT/out of bed  Patient has a known history of obstructive sleep apnea and is compliant with CPAP nightly  Reviewed the risks of untreated sleep apnea with patient and she verbalized understanding  Provided patient with contact information to office and encouraged her to follow-up upon discharge in the event she has any issues with obstructive sleep apnea and/or CPAP unit and she agreed  Reviewed with collaborating physician Dr. Miller  Will sign off, please reconsult if needed  Thank you for this consultation      Kala Merritt APRN-CNP  [facility]

## 2024-01-25 NOTE — NURSING NOTE
Pt asleep. No s/s of pain/discomfort noted. All surgical sites to abd remains C/D/I. Pt ambulated several time during shift. No change noted from initial shift assessment. Orders/chart review completed. Call light in reach.

## 2024-01-25 NOTE — PROGRESS NOTES
Reshma Francisco is a 48 y.o. female on day 1 of admission presenting with Obesity, morbid (CMS/HCC).    Subjective   Alert, denies SOB, abdominal pain or nausea, she ambulates, uses IS       Objective     Physical Exam  Cardiovascular:      Rate and Rhythm: Normal rate and regular rhythm.   Pulmonary:      Breath sounds: Normal breath sounds. No wheezing.   Abdominal:      General: Bowel sounds are normal.   Musculoskeletal:         General: No swelling.         Last Recorded Vitals  Blood pressure 126/65, pulse 65, temperature 36.9 °C (98.4 °F), temperature source Oral, resp. rate 17, weight 136 kg (300 lb 11.3 oz), SpO2 98 %.  Intake/Output last 3 Shifts:  I/O last 3 completed shifts:  In: 2881.3 (21.1 mL/kg) [I.V.:2781.3 (20.4 mL/kg); IV Piggyback:100]  Out: 850 (6.2 mL/kg) [Urine:850 (0.2 mL/kg/hr)]  Weight: 136.4 kg     Relevant Results                             Assessment/Plan   Principal Problem:    Obesity, morbid (CMS/HCC)  Active Problems:    Morbid obesity with BMI of 40.0-44.9, adult (CMS/HCC)    CHF (congestive heart failure) (CMS/[facility])    Metabolic syndrome    Hiatal hernia    Gastroesophageal reflux disease with esophagitis    HTN- will monitor BP  DM- will continue low dose Levemir       I spent 15 minutes in the professional and overall care of this patient.      Jaz Crews MD

## 2024-01-25 NOTE — PROGRESS NOTES
Reshma Francisco is a 48 y.o. woman POD#1 s/p RYGB with HHR.    Subjective   Feels better as the day has progressed.       Objective     Physical Exam  Abdomen soft, incisions dry, tender at 12mm trocar sites- no peritonitis  Last Recorded Vitals  Blood pressure 138/62, pulse 98, temperature 36.7 °C (98.1 °F), temperature source Oral, resp. rate 18, weight 136 kg (300 lb 11.3 oz), SpO2 97 %.  Intake/Output last 3 Shifts:  I/O last 3 completed shifts:  In: 2881.3 (21.1 mL/kg) [I.V.:2781.3 (20.4 mL/kg); IV Piggyback:100]  Out: 850 (6.2 mL/kg) [Urine:850 (0.2 mL/kg/hr)]  Weight: 136.4 kg     Relevant Results  UGI with slow emptying at gastrojejunostomy- no leak or obstruction.          Assessment/Plan   Principal Problem:    Obesity, morbid (CMS/[facility])  Active Problems:    Morbid obesity with BMI of 40.0-44.9, adult (CMS/[facility])    CHF (congestive heart failure) (CMS/[facility])    Metabolic syndrome    Hiatal hernia    Gastroesophageal reflux disease with esophagitis    Go slow with liquids today.  Continue IVF.  Minimize crushed oral meds until pace of liquid intake improves.  Will evaluate discharge yesterday afternoon.      Mike Luke MD

## 2024-01-25 NOTE — PROGRESS NOTES
Reshma Francisco is a 48 y.o. female on day 1 of admission presenting with Obesity, morbid (CMS/HCC).    Subjective   Feels okay.  Postop pain is okay.  No nausea or vomiting today.  Did have some nausea yesterday but no vomiting yesterday.  No gas or bowel movements postoperatively yet.  No angina, shortness of breath, bleeding, voiding problems.  Feels full.  Does not feel bloated.  Does not feel empty or hungry.  When asked denies any issues.  Getting out of bed and using I-S.     Objective     Vital signs in last 24 hours:  Temp:  [36.4 °C (97.6 °F)-37.1 °C (98.8 °F)] 36.6 °C (97.9 °F)  Heart Rate:  [] 99  Resp:  [17-30] 17  BP: (114-188)/(43-94) 176/78  Heart Rate:  []   Temp:  [36.4 °C (97.6 °F)-37.1 °C (98.8 °F)]   Resp:  [17-30]   BP: (114-188)/(43-94)   Weight:  [136 kg (300 lb 11.3 oz)]   SpO2:  [92 %-100 %]      Intake/Output last 3 Shifts:  I/O last 3 completed shifts:  In: 2881.3 (21.1 mL/kg) [I.V.:2781.3 (20.4 mL/kg); IV Piggyback:100]  Out: 850 (6.2 mL/kg) [Urine:850 (0.2 mL/kg/hr)]  Weight: 136.4 kg     Physical Exam  Came to see patient at 0858 and again at 0904 however the patient was off the floor so went to x-ray and seen in x-ray at 0908  Sitting in wheelchair  No acute distress  Nonseptic appearing  Looks fine and comfortable  Alert and oriented  Heart regular  Lungs clear  No edema  Abdomen soft, positive bowel sounds, very mildly distended consistent with surgery, nontender, glued incisions are all without any issues    Scheduled medications  acetaminophen, 1,000 mg, intravenous, q6h  amLODIPine, 10 mg, oral, Daily  enoxaparin, 40 mg, subcutaneous, q24h MIGUEL A  insulin glargine, 20 Units, subcutaneous, q24h  insulin lispro, 0-10 Units, subcutaneous, q4h  lisinopril, 40 mg, oral, Daily  metoprolol, 5 mg, intravenous, q6h  oxygen, , inhalation, Continuous - 02/gases  oxygen, 2 L/min, inhalation, Continuous - 02/gases  pantoprazole, 40 mg, intravenous, Daily before  breakfast      Continuous medications  lactated Ringer's, 125 mL/hr, Last Rate: 125 mL/hr (01/25/24 1319)      PRN medications  PRN medications: buprenorphine, dextrose, glucagon, hydrALAZINE, metoclopramide, naloxone, ondansetron    Relevant Results  Results from last 7 days   Lab Units 01/25/24  0553 01/24/24  0708   WBC AUTO x10*3/uL 11.3 4.3*   HEMOGLOBIN g/dL 11.1* 11.2*   HEMATOCRIT % 34.6* 33.9*   PLATELETS AUTO x10*3/uL 278 309      Results from last 7 days   Lab Units 01/24/24  0708   SODIUM mmol/L 143   POTASSIUM mmol/L 3.4   CHLORIDE mmol/L 105   CO2 mmol/L 24   BUN mg/dL 13   CREATININE mg/dL 1.10   GLUCOSE mg/dL 132*   CALCIUM mg/dL 9.0          Assessment/Plan   Principal Problem:    Obesity, morbid (CMS/[facility])  Active Problems:    Morbid obesity with BMI of 40.0-44.9, adult (CMS/[facility])    CHF (congestive heart failure) (CMS/[facility])    Metabolic syndrome    Hiatal hernia    Gastroesophageal reflux disease with esophagitis    Postop day 1 gastric bypass laparoscopic on 1/24  Hemodynamically stable  N.p.o.  IV fluids  Protonix  Pain/nausea meds  I-S  Medicine consulted for medical management  Await results of today's upper GI    YOLANDE  CPAP ordered  Treatment per IM/pulmonology    CHF  Treatment per IM    Type 2 diabetes  On metformin at home  Continue with insulin sliding scale  Continue with Lantus  Treatment per IM    Hypertension  Continue with Norvasc  Continue with lisinopril  Continue with Lopressor  Treatment per IM    Elevated cholesterol  On Zetia at home  Treatment per IM    Depression  Treatment per IM    DVT prophylaxis  Knee-high teds  SCDs  OOB  Lovenox      Keily Feliz, APRN-CNP

## 2024-01-25 NOTE — NURSING NOTE
Spoke with dr. Crews , she is aware of pts blood pressure today, Vbr, since oral meds given , does not want 6pm iv metoprolol given, VBR

## 2024-01-25 NOTE — PROGRESS NOTES
Dietary Rounds     Reshma was sitting up in the recliner chair upon arrival. At the time of visit, Reshma was recently cleared for PO fluid intake and had consumed 4 oz of fluids so far with no problems or concerns. Reshma reports she is walking and using the IS every hour. I encouraged Reshma to continue to take small, frequent sips of fluids about every 3-5 minutes. Pt showed good understanding.     Thin liquid education packet was given to patient. Will review it with Reshma tomorrow, prior to expected discharge.       Vanda Jin RD, LDN  Registered Dietitian, Licensed Dietitian Nutritionist

## 2024-01-26 ENCOUNTER — PHARMACY VISIT (OUTPATIENT)
Dept: PHARMACY | Facility: CLINIC | Age: 49
End: 2024-01-26
Payer: COMMERCIAL

## 2024-01-26 VITALS
SYSTOLIC BLOOD PRESSURE: 144 MMHG | OXYGEN SATURATION: 100 % | HEART RATE: 88 BPM | RESPIRATION RATE: 17 BRPM | BODY MASS INDEX: 47.89 KG/M2 | WEIGHT: 293 LBS | TEMPERATURE: 97.9 F | DIASTOLIC BLOOD PRESSURE: 69 MMHG

## 2024-01-26 PROBLEM — K44.9 HIATAL HERNIA: Status: RESOLVED | Noted: 2024-01-24 | Resolved: 2024-01-26

## 2024-01-26 LAB
ANION GAP SERPL CALC-SCNC: 9 MMOL/L
BASOPHILS # BLD AUTO: 0.02 X10*3/UL (ref 0–0.1)
BASOPHILS NFR BLD AUTO: 0.3 %
BUN SERPL-MCNC: 10 MG/DL (ref 8–25)
CALCIUM SERPL-MCNC: 8.5 MG/DL (ref 8.5–10.4)
CHLORIDE SERPL-SCNC: 112 MMOL/L (ref 97–107)
CO2 SERPL-SCNC: 25 MMOL/L (ref 24–31)
CREAT SERPL-MCNC: 1.3 MG/DL (ref 0.4–1.6)
EGFRCR SERPLBLD CKD-EPI 2021: 50 ML/MIN/1.73M*2
EOSINOPHIL # BLD AUTO: 0.08 X10*3/UL (ref 0–0.7)
EOSINOPHIL NFR BLD AUTO: 1.2 %
ERYTHROCYTE [DISTWIDTH] IN BLOOD BY AUTOMATED COUNT: 12.4 % (ref 11.5–14.5)
GLUCOSE BLD MANUAL STRIP-MCNC: 86 MG/DL (ref 74–99)
GLUCOSE BLD MANUAL STRIP-MCNC: 90 MG/DL (ref 74–99)
GLUCOSE SERPL-MCNC: 89 MG/DL (ref 65–99)
HCT VFR BLD AUTO: 30.3 % (ref 36–46)
HGB BLD-MCNC: 9.6 G/DL (ref 12–16)
IMM GRANULOCYTES # BLD AUTO: 0.04 X10*3/UL (ref 0–0.7)
IMM GRANULOCYTES NFR BLD AUTO: 0.6 % (ref 0–0.9)
LYMPHOCYTES # BLD AUTO: 1.79 X10*3/UL (ref 1.2–4.8)
LYMPHOCYTES NFR BLD AUTO: 27 %
MCH RBC QN AUTO: 30.3 PG (ref 26–34)
MCHC RBC AUTO-ENTMCNC: 31.7 G/DL (ref 32–36)
MCV RBC AUTO: 96 FL (ref 80–100)
MONOCYTES # BLD AUTO: 0.65 X10*3/UL (ref 0.1–1)
MONOCYTES NFR BLD AUTO: 9.8 %
NEUTROPHILS # BLD AUTO: 4.05 X10*3/UL (ref 1.2–7.7)
NEUTROPHILS NFR BLD AUTO: 61.1 %
NRBC BLD-RTO: 0 /100 WBCS (ref 0–0)
PLATELET # BLD AUTO: 255 X10*3/UL (ref 150–450)
POTASSIUM SERPL-SCNC: 3.4 MMOL/L (ref 3.4–5.1)
RBC # BLD AUTO: 3.17 X10*6/UL (ref 4–5.2)
SODIUM SERPL-SCNC: 146 MMOL/L (ref 133–145)
WBC # BLD AUTO: 6.6 X10*3/UL (ref 4.4–11.3)

## 2024-01-26 PROCEDURE — 2500000005 HC RX 250 GENERAL PHARMACY W/O HCPCS: Performed by: INTERNAL MEDICINE

## 2024-01-26 PROCEDURE — RXOTC WILLOW AMBULATORY OTC CHARGE

## 2024-01-26 PROCEDURE — 99024 POSTOP FOLLOW-UP VISIT: CPT | Performed by: NURSE PRACTITIONER

## 2024-01-26 PROCEDURE — 99232 SBSQ HOSP IP/OBS MODERATE 35: CPT | Performed by: INTERNAL MEDICINE

## 2024-01-26 PROCEDURE — 36415 COLL VENOUS BLD VENIPUNCTURE: CPT | Performed by: SURGERY

## 2024-01-26 PROCEDURE — 2500000004 HC RX 250 GENERAL PHARMACY W/ HCPCS (ALT 636 FOR OP/ED): Performed by: INTERNAL MEDICINE

## 2024-01-26 PROCEDURE — 85025 COMPLETE CBC W/AUTO DIFF WBC: CPT | Performed by: SURGERY

## 2024-01-26 PROCEDURE — 80048 BASIC METABOLIC PNL TOTAL CA: CPT | Performed by: SURGERY

## 2024-01-26 PROCEDURE — 82947 ASSAY GLUCOSE BLOOD QUANT: CPT

## 2024-01-26 PROCEDURE — C9113 INJ PANTOPRAZOLE SODIUM, VIA: HCPCS | Performed by: SURGERY

## 2024-01-26 PROCEDURE — 2500000004 HC RX 250 GENERAL PHARMACY W/ HCPCS (ALT 636 FOR OP/ED): Performed by: SURGERY

## 2024-01-26 PROCEDURE — RXMED WILLOW AMBULATORY MEDICATION CHARGE

## 2024-01-26 PROCEDURE — 2500000001 HC RX 250 WO HCPCS SELF ADMINISTERED DRUGS (ALT 637 FOR MEDICARE OP): Performed by: INTERNAL MEDICINE

## 2024-01-26 RX ORDER — OXYCODONE HCL 5 MG/5 ML
5 SOLUTION, ORAL ORAL EVERY 6 HOURS PRN
Qty: 60 ML | Refills: 0 | Status: SHIPPED | OUTPATIENT
Start: 2024-01-26 | End: 2024-03-11 | Stop reason: ALTCHOICE

## 2024-01-26 RX ORDER — ACETAMINOPHEN 160 MG/5ML
650 SUSPENSION ORAL EVERY 4 HOURS PRN
Qty: 240 ML | Refills: 2 | Status: SHIPPED | OUTPATIENT
Start: 2024-01-26

## 2024-01-26 RX ADMIN — SODIUM CHLORIDE, SODIUM LACTATE, POTASSIUM CHLORIDE, AND CALCIUM CHLORIDE 125 ML/HR: 600; 310; 30; 20 INJECTION, SOLUTION INTRAVENOUS at 09:27

## 2024-01-26 RX ADMIN — METOPROLOL TARTRATE 5 MG: 5 INJECTION INTRAVENOUS at 05:07

## 2024-01-26 RX ADMIN — PANTOPRAZOLE SODIUM 40 MG: 40 INJECTION, POWDER, FOR SOLUTION INTRAVENOUS at 05:05

## 2024-01-26 RX ADMIN — AMLODIPINE BESYLATE 10 MG: 10 TABLET ORAL at 09:28

## 2024-01-26 RX ADMIN — LISINOPRIL 40 MG: 40 TABLET ORAL at 09:28

## 2024-01-26 RX ADMIN — ENOXAPARIN SODIUM 40 MG: 40 INJECTION SUBCUTANEOUS at 09:28

## 2024-01-26 RX ADMIN — HYDRALAZINE HYDROCHLORIDE 10 MG: 20 INJECTION INTRAMUSCULAR; INTRAVENOUS at 13:30

## 2024-01-26 RX ADMIN — SODIUM CHLORIDE, SODIUM LACTATE, POTASSIUM CHLORIDE, AND CALCIUM CHLORIDE 125 ML/HR: 600; 310; 30; 20 INJECTION, SOLUTION INTRAVENOUS at 00:46

## 2024-01-26 ASSESSMENT — COGNITIVE AND FUNCTIONAL STATUS - GENERAL
DAILY ACTIVITIY SCORE: 24
MOBILITY SCORE: 24

## 2024-01-26 ASSESSMENT — PAIN SCALES - GENERAL: PAINLEVEL_OUTOF10: 0 - NO PAIN

## 2024-01-26 ASSESSMENT — PAIN - FUNCTIONAL ASSESSMENT: PAIN_FUNCTIONAL_ASSESSMENT: FLACC (FACE, LEGS, ACTIVITY, CRY, CONSOLABILITY)

## 2024-01-26 NOTE — PROGRESS NOTES
Dietary Rounds     Reshma was laying in bed upon arrival. She reports that yesterday she had a fluid intake of ~30 oz. Today, Reshma has consumed 4 oz of water so far. Reshma reports she is using the IS every hour, however her last walk was at 5 am. I strongly recommended that Reshma get up to walk and then sit up in the recliner chair and focus on fluid intake. I demonstrated how Reshma can use the 1oz medicine cup to easily keep track of fluids and ensure small sips. I emphasized the importance of frequent sips throughout the day. Pt showed good understanding and was getting up to walk as I was leaving.     Reviewed the importance of consuming at least 50 oz of fluid per day once discharged. Patient was instructed to start protein shake tomorrow evening once 40-50 oz of fluid was consumed. Informed patient to gradually work way up to 50g protein per day. Also reviewed things to avoid at this time including carbonation, alcohol, sugar, jello, straws, and gum. Patient was instructed to follow liquid diet for a full 2 weeks, and to not progress diet until instructed to do so. Patient shows good understanding of education provided.        Vanda Jin RD, LDN  Registered Dietitian, Licensed Dietitian Nutritionist

## 2024-01-26 NOTE — DISCHARGE SUMMARY
Discharge Diagnosis  Obesity, morbid (CMS/HCC)    Issues Requiring Follow-Up  Follow up with Dr. Luke as previously scheduled.     Test Results Pending At Discharge  Pending Labs       No current pending labs.            Hospital Course   1/24/24 Underwent laparoscopic [place]-en-y gastric bypass    Pertinent Physical Exam At Time of Discharge  Physical Exam  Constitutional:       Appearance: Normal appearance.   HENT:      Head: Normocephalic and atraumatic.      Right Ear: Tympanic membrane normal.      Nose: Nose normal.      Mouth/Throat:      Mouth: Mucous membranes are moist.   Eyes:      Pupils: Pupils are equal, round, and reactive to light.   Cardiovascular:      Rate and Rhythm: Normal rate and regular rhythm.      Pulses: Normal pulses.   Pulmonary:      Effort: Pulmonary effort is normal.      Breath sounds: Normal breath sounds.   Abdominal:      General: Bowel sounds are normal. There is no distension.      Palpations: Abdomen is soft.      Tenderness: There is no abdominal tenderness. There is no guarding.      Hernia: No hernia is present.      Comments: Abdominal laparoscopic sites with skin glue CDI   Genitourinary:     Rectum: Normal.   Musculoskeletal:         General: Normal range of motion.   Skin:     General: Skin is warm and dry.   Neurological:      General: No focal deficit present.      Mental Status: She is alert.   Psychiatric:         Mood and Affect: Mood normal.         Behavior: Behavior normal.         Home Medications     Medication List      START taking these medications     acetaminophen 160 mg/5 mL elixir; Commonly known as: Tylenol; Take 20.3   mL (650 mg) by mouth every 4 hours if needed for moderate pain (4 - 6).   clopidogrel 75 mg tablet; Commonly known as: Plavix; Take 1 tablet (75   mg) by mouth once daily. Start in 2 weeks   multivitamin-children's chewable tablet; Commonly known as: Cerovite,   Jr; Chew 1 tablet once daily.   oxyCODONE 5 mg/5 mL solution; Commonly  known as: Roxicodone; Take 5 mL   (5 mg) by mouth every 6 hours if needed for severe pain (7 - 10).     CHANGE how you take these medications     aspirin 81 mg chewable tablet   Levemir U-100 Insulin 100 unit/mL injection; Generic drug: insulin   detemir; Inject 20 Units under the skin once daily at bedtime. Take as   directed per insulin instructions.; What changed: See the new   instructions.     CONTINUE taking these medications     amLODIPine 10 mg tablet; Commonly known as: Norvasc   ezetimibe 10 mg tablet; Commonly known as: Zetia   lisinopril 40 mg tablet   NovoLOG U-100 Insulin aspart 100 unit/mL injection; Generic drug:   insulin aspart   omeprazole 40 mg DR capsule; Commonly known as: PriLOSEC     STOP taking these medications     chlorthalidone 25 mg tablet; Commonly known as: [place]   ergocalciferol 1.25 MG (89151 UT) capsule; Commonly known as: Vitamin   D-2   lactulose 20 gram/30 mL oral solution   metFORMIN 1,000 mg tablet; Commonly known as: Glucophage   Trulicity 1.5 mg/0.5 mL pen injector injection; Generic drug:   dulaglutide       Outpatient Follow-Up  Future Appointments   Date Time Provider Department Center   2/2/2024 11:00 AM Mike Luke MD [id number] [facility]   2/16/2024 10:15 AM Vanda Jin RDN, FERNANDO [id number] [facility]   3/1/2024 12:30 PM Mike Luke MD [id number] [facility]   3/11/2024 10:30 AM Avtar Smith MD [id number] [place]       Ila Ramirez APRN-CNP

## 2024-01-26 NOTE — PROGRESS NOTES
Reshma Francisco is a 48 y.o. female on day 2 of admission presenting with Obesity, morbid (CMS/HCC).      Met with patient at bedside.  An explanation of discharge planning was provided.  Patient will discharge to her sister's home upon discharge.  There are three steps to enter the home.  There is a bathroom available on the first floor.  Patient is independent with ADL's.  She does not require the use of a walker or cane. Patient drives , cooks and cleans.  Patient does not require oxygen, but is compliant with cpap at night. Patient does not smoke or drink alcohol.  Denies recent falls.  PCP is Dr. Vieira. Patient does not have a POA and declines paperwork.  Plan is discharge home with sister.  No skilled needs.     DC PLAN IS SECURE                             Wendy Hoskins RN

## 2024-01-26 NOTE — PROGRESS NOTES
Dietary Rounds     Checked-in on Reshma. She was sitting up in the recliner chair upon arrival and reports just getting back from a walk. Reshma has consumed 23oz of fluids from the provided broth and water. I strongly encouraged Reshma to keep taking small frequent sips of fluids and continue walking/using the IS every hour. Pt showed good understanding and has no questions at this time.       Vanda Jin, RD, LDN  Registered Dietitian, Licensed Dietitian Nutritionist

## 2024-01-26 NOTE — CARE PLAN
The patient's goals for the shift include  dc planning. Sip my fluids    The clinical goals for the shift include pain control

## 2024-01-26 NOTE — DISCHARGE INSTRUCTIONS
Gastric bypass homegoing instructions    #1 Fluids             drink 50 - 65 oz of non-caffeinated fluids daily  #2 Walk               walk 3 - 5 minutes every hour during the day  #3 Spirometer   use the incentive spirometer 10 times and hour during the day  #4 Protein          after drinking 50oz of fluids begin drinking protein    Wound Care  Do not submerge incisions in pool, bath, or hot tub  Do not peel off Dermabond -it will gradually loosen and fall off  ° You may shower and pat incisions dry  Do not apply any lotions, creams, or ointments to your incisions  Activity  Do not push, pull, or lift.  Avoid excessive bending and twisting at the waist.  You may walk stairs.  You may sleep in any position that feels comfortable.  You must get up and walk every hour during the day.  If you plan to take a nap during the day, set an alarm for one hour so you will get up and walk around.  Potential Complications   Wound Infection - redness, increased warmth, pain, thick drainage, fever  Blood Clot/Pulmonary Embolism - calf pain or swelling, chest pain, shortness of breath, racing heart, fast breathing  Leak - abdominal pain radiating down the left side (after eating/drinking), fever, fast breathing, or rapid heart rate.   CALL 9-1-1 WITH ANY CHEST PAIN OR SHORTNESS OF BREATH, otherwise call the office with any concerns. [phone]  Medications  All medications need to be crushable, chewable, in liquid form or open capsules.   CANNOT crush extended release meds.  You must begin taking your stomach acid reducing medication the morning after you are discharged from the hospital.  Start your chewable or liquid multivitamin tomorrow.  Diet  Full liquid.  NO CARBONATION.  Must be thin enough to go up a small stirrer-type straw. BUT DO NOT USE STRAWS.  Remember first goal is to drink at least 50 oz. fluid every day.  Increase your protein as tolerated for a goal of 50 grams of protein daily.  Refer to your manual for  explicit instructions.  Do not drink any fluid that has more than 25 grams of carbohydrate per 8 oz.  This will prevent dumping.  Follow-up     2 weeks

## 2024-01-26 NOTE — NURSING NOTE
Pt A&O x3 currently resting in bed. No complaints or concerns. Call light within reach.  Pt continues on tele per order.

## 2024-01-26 NOTE — PROGRESS NOTES
Reshma Francisco is a 48 y.o. female on day 2 of admission presenting with Obesity, morbid (CMS/HCC).    Subjective   Feeling well, much better than yesterday. She has started with her fluids and is 28 ounces for the day already.   +flatus and bms. No n/v. No chest pain or sob.        Objective     Physical Exam  Constitutional:       Appearance: Normal appearance. She is obese.   HENT:      Head: Normocephalic and atraumatic.      Right Ear: Tympanic membrane normal.      Nose: Nose normal.      Mouth/Throat:      Mouth: Mucous membranes are moist.   Eyes:      Pupils: Pupils are equal, round, and reactive to light.   Cardiovascular:      Rate and Rhythm: Normal rate and regular rhythm.      Pulses: Normal pulses.   Pulmonary:      Effort: Pulmonary effort is normal.      Breath sounds: Normal breath sounds.   Abdominal:      General: Bowel sounds are normal. There is no distension.      Palpations: Abdomen is soft.      Tenderness: There is no abdominal tenderness. There is no guarding.      Hernia: No hernia is present.      Comments: Abdominal laparoscopic sites are CDI   Genitourinary:     Rectum: Normal.   Musculoskeletal:         General: Normal range of motion.   Skin:     General: Skin is warm and dry.   Neurological:      General: No focal deficit present.      Mental Status: She is alert.   Psychiatric:         Mood and Affect: Mood normal.         Behavior: Behavior normal.         Last Recorded Vitals  Blood pressure 150/65, pulse 88, temperature 36.6 °C (97.9 °F), temperature source Oral, resp. rate 18, weight 135 kg (296 lb 11.8 oz), SpO2 98 %.  Intake/Output last 3 Shifts:  I/O last 3 completed shifts:  In: 2708.8 (20.1 mL/kg) [P.O.:890; I.V.:1818.8 (13.5 mL/kg)]  Out: 2350 (17.5 mL/kg) [Urine:2350 (0.5 mL/kg/hr)]  Weight: 134.6 kg     Relevant Results  Lab Results   Component Value Date    GLUCOSE 89 01/26/2024    CALCIUM 8.5 01/26/2024     (H) 01/26/2024    K 3.4 01/26/2024    CO2 25  01/26/2024     (H) 01/26/2024    BUN 10 01/26/2024    CREATININE 1.30 01/26/2024     Lab Results   Component Value Date    WBC 6.6 01/26/2024    HGB 9.6 (L) 01/26/2024    HCT 30.3 (L) 01/26/2024    MCV 96 01/26/2024     01/26/2024                       Assessment/Plan   Principal Problem:    Obesity, morbid (CMS/[facility])  Active Problems:    Morbid obesity with BMI of 40.0-44.9, adult (CMS/[facility])    CHF (congestive heart failure) (CMS/[facility])    Metabolic syndrome    Hiatal hernia    Gastroesophageal reflux disease with esophagitis    1/26/24 Feeling well. Tolerating clears. +bowel function. Long discussion was had regarding homegoing post op instructions pertaining to diet, and restrictions, and red flag symptoms.        I spent 15 minutes in the professional and overall care of this patient.      Ila Ramirez, APRN-CNP

## 2024-01-26 NOTE — PROGRESS NOTES
Reshma Francisco is a 48 y.o. female on day 2 of admission presenting with Obesity, morbid (CMS/HCC).    Subjective   Alert, denies SOB, abdominal pain or nausea, she ambulates, uses IS       Objective     Physical Exam    Last Recorded Vitals  Blood pressure 144/69, pulse 88, temperature 36.6 °C (97.9 °F), temperature source Oral, resp. rate 17, weight 135 kg (296 lb 11.8 oz), SpO2 100 %.  Intake/Output last 3 Shifts:  I/O last 3 completed shifts:  In: 2708.8 (20.1 mL/kg) [P.O.:890; I.V.:1818.8 (13.5 mL/kg)]  Out: 2350 (17.5 mL/kg) [Urine:2350 (0.5 mL/kg/hr)]  Weight: 134.6 kg     Relevant Results                             Assessment/Plan   Principal Problem:    Obesity, morbid (CMS/HCC)  Active Problems:    Morbid obesity with BMI of 40.0-44.9, adult (CMS/HCC)    CHF (congestive heart failure) (CMS/[facility])    Metabolic syndrome    Gastroesophageal reflux disease with esophagitis    Continue to monitor BP and blood glucose. Aspirin for 2 weeks then change to Plavix       I spent 15 minutes in the professional and overall care of this patient.      Jaz Crews MD

## 2024-01-29 ENCOUNTER — TELEPHONE (OUTPATIENT)
Dept: SURGERY | Facility: CLINIC | Age: 49
End: 2024-01-29
Payer: COMMERCIAL

## 2024-01-29 NOTE — TELEPHONE ENCOUNTER
Bariatric Surg Post-op Call:          Date: 1/29/2024         Called by:  Vanda Jin RD, LDN         How many ounces of fluid are you drinking? 64 oz on Saturday and Sunday. 10 oz so far today.          How many grams of protein are you drinking?  None yet. Instructed Reshma to have a protein shake this evening after increased fluid intake today.          Any intolerances? No          Any vomiting? No          Still taking pain medication? Yes. Tylenol as needed.          Increase in incision pain or drainage? No          Are you taking something to prevent blood clots? No          If yes, do you have a plan for INR to be checked? N/A         Are you walking every hour? Yes          Is your first f/u appointment scheduled and when? Yes, on Friday 2/2/2024 at 11 am.       Vanda Jin RD, LDN  Registered Dietitian, Licensed Dietitian Nutritionist

## 2024-01-31 NOTE — PROGRESS NOTES
2 WK FUV RYGB  START WT: 304   IDEAL WT:   155 START EXCESS:  149  TOTAL WT LOSS: 31       EWL:    21 % HT: 66 IN  HPI:     General Comments:          Do you have any difficulties with:  swallowing?  No, nausea?  No, vomiting?  No, pain?  No, heartburn?  No, discomfort?  No, intolerances?  No.   Fluid and Protein Intake:    Fluid intake (ounces):   64     Sources:,   Protein intake (grams): 30     Sources:.   Chewable MVI:  Taking as directed?  Yes. PPI/omeprazole:  Taking as directed/prescribed?  Yes. Walking:  Every hour?  Yes. Gallbladder:  Removed?  No. Primary Care Physician:  Have you seen?  No, Have appointment scheduled?  no instructed to schedule and agrees     -Bariatric Follow-up:          Receive IV fluids  No. Seen in ER with admission No. Seen ER without admission No. Hospital readmission No.          Medical History:          Objective:        Physical Examination:       Incisions closed. No erythema. No drainage.         Assessment:        Assessment:    1. Surgery follow-up examination - Z09 (Primary)      Plan:        1. Others    Notes:  Advance diet per protocol  Continue PPI until 3 months  Continue MVI with Fe  May increase walking as tolerated, can start cardio if able to compensate increased fluid loss.              Preventive Medicine:      Counseling:  Medication education:  Education:  All new and/or current medications discussed and reviewed including side effects with patient/caregiver, Understanding:  Caregiver/Patient expressed understanding., Adherence:  Barriers to adherence identified and discussed if present. BMI Care goal follow up plan:  Above normal BMI management provided  Dietary management education, guidance, and counseling.           Follow Up: 2 Weeks                   Billing Information:         Visit Code:        Procedure Codes:

## 2024-01-31 NOTE — DOCUMENTATION CLARIFICATION NOTE
"    PATIENT:               RESHMA MORA  ACCT #:                  [id number]  MRN:                       [id number]  :                       1975  ADMIT DATE:       2024 6:12 AM  DISCH DATE:        2024 5:40 PM  RESPONDING PROVIDER #:        [id number]          PROVIDER RESPONSE TEXT:    Elevated sodium not requiring treatment or evaluation    CDI QUERY TEXT:    [facility]_Abnormal Studies    Instruction:    Based on your assessment of the patient and the clinical information, please provide the requested documentation by clicking on the appropriate radio button and enter any additional information if prompted.    Question: Is there a diagnosis indicative of the lab values      When answering this query, please exercise your independent professional judgment. The fact that a question is being asked, does not imply that any particular answer is desired or expected.    The patient's clinical indicators include:  Clinical Information:  48y F s/p gastric bypass on 24    Clinical Indicators:   to  Sodium levels 143, 143, 146   to 126 Creatinine levels 1.10, 1.30   Op Note \"hypertension...Metabolic Syndrome\"   1349 RN Note \"Prn iv hydralazine\"   Dietitian \"She reports that yesterday she had a fluid intake of   30 oz. Today, Reshma has consumed 4 oz of water so far... strongly recommended...focus on fluid intake...emphasized the importance of frequent sips throughout the day\"    Treatment:   0714 to  1449 IVF LR at 100 ml/hr   1728 to  1230 IVF LR at 125 ml/hr  Dietitian consult and recommendations    Risk Factors:  Metabolic Syndrome, HTN, Diuretics, Surgery  Options provided:  -- Hypernatremia was clinically significant and required treatment/monitoring  -- Elevated sodium not requiring treatment or evaluation  -- Other - I will add my own diagnosis  -- Refer to Clinical Documentation Reviewer    Query created by: Jess Jacobo on 2024 11:00 AM      Electronically " signed by:  JAIME RITTER-CNP 1/31/2024 11:38 AM

## 2024-01-31 NOTE — DOCUMENTATION CLARIFICATION NOTE
"    PATIENT:               ZECHARIAH MORA  ACCT #:                  [id number]  MRN:                       [id number]  :                       1975  ADMIT DATE:       2024 6:12 AM  DISCH DATE:        2024 5:40 PM  RESPONDING PROVIDER #:        [id number]          PROVIDER RESPONSE TEXT:    Chronic Systolic Congestive Heart Failure    CDI QUERY TEXT:    [facility]_CHF    Instruction:    Based on your assessment of the patient and the clinical information, please provide the requested documentation by clicking on the appropriate radio button and enter any additional information if prompted.    Question: Please further clarify the type and acuity of congestive heart failure      When answering this query, please exercise your independent professional judgment. The fact that a question is being asked, does not imply that any particular answer is desired or expected.    The patient's clinical indicators include:  Clinical Information:  48y F admitted for planned gastric bypass procedure    Clinical Indicators:  22 Prior Office Visit \"Endorses some more LE edema...Start lisinopril 10mg\"  10/10/23 Prior Nuclear Stress Test \"Left ventricular ejection fraction is 42 percent...Findings suggesting nontransmural infarction of the lateral wall and septum...No evidence for reversible ischemic change\"  23 Prior Office Visit \"lisinopril 40 mg tablet Take 1 tablet by mouth once daily\"  01/15/24 Prior Outpatient Cardiology Dr. Smith \"The EKG shows LVH LAD and poor R wave across the precordium which is unchanged from last May. The BP is up today and will add a diuretic. The stress last year shows no ischemia yet may have a decreased EF and remote scars. Will clear for her surgery cardiac wise. Will see afterwards and further address the LV function...Chlorthalidone 25mg PO QD\"  24 Anesthesia Pre Procedure \"Cardiovascular...positive for CHF Congestive heart failure\"  24 and 24 General Surgery " "\"CHF...treatment per IM\"  1/25/24 and 1/26/24 Internal Med \"CHF\"    Treatment:  Labetalol 2.5 mg IV given 1/24 1059 to 1417 x 6 doses  Labetalol 5 mg IV given 1/24 1514 to 1533 x 4 doses  Hydralazine 5 mg IV given 1/24 1540  Hydralazine 10 mg IV Q 4H PRN given 1/24 2014 to 1/26 1330 x 5 doses  Lisinopril 40 mg PO QD given 1/25 0900 and 1/26 0928  Norvasc 10 mg PO QD given 1/25 0900 and 1/26 0928  Lopressor 5 mg IV Q 6H  given 1/24 1727 to 1/26 0507 x 5 doses    Risk Factors:  HTN, Last EF 40%, Betablockers, Diuretics  Options provided:  -- Chronic Systolic Congestive Heart Failure  -- Other - I will add my own diagnosis  -- Refer to Clinical Documentation Reviewer    Query created by: Jess Jacobo on 1/30/2024 11:00 AM      Electronically signed by:  JAIME RITTER-CNP 1/31/2024 11:38 AM          "

## 2024-02-02 ENCOUNTER — NUTRITION (OUTPATIENT)
Dept: SURGERY | Facility: CLINIC | Age: 49
End: 2024-02-02

## 2024-02-02 ENCOUNTER — OFFICE VISIT (OUTPATIENT)
Dept: SURGERY | Facility: CLINIC | Age: 49
End: 2024-02-02
Payer: COMMERCIAL

## 2024-02-02 VITALS
BODY MASS INDEX: 43.87 KG/M2 | SYSTOLIC BLOOD PRESSURE: 140 MMHG | HEART RATE: 88 BPM | DIASTOLIC BLOOD PRESSURE: 76 MMHG | HEIGHT: 66 IN | WEIGHT: 273 LBS

## 2024-02-02 DIAGNOSIS — Z98.84 S/P BARIATRIC SURGERY: Primary | ICD-10-CM

## 2024-02-02 PROCEDURE — 3008F BODY MASS INDEX DOCD: CPT | Performed by: SURGERY

## 2024-02-02 PROCEDURE — 4010F ACE/ARB THERAPY RXD/TAKEN: CPT | Performed by: SURGERY

## 2024-02-02 PROCEDURE — 3078F DIAST BP <80 MM HG: CPT | Performed by: SURGERY

## 2024-02-02 PROCEDURE — 1036F TOBACCO NON-USER: CPT | Performed by: SURGERY

## 2024-02-02 PROCEDURE — 3077F SYST BP >= 140 MM HG: CPT | Performed by: SURGERY

## 2024-02-02 PROCEDURE — 99024 POSTOP FOLLOW-UP VISIT: CPT | Performed by: SURGERY

## 2024-02-02 RX ORDER — URSODIOL 300 MG/1
300 CAPSULE ORAL 2 TIMES DAILY
Qty: 180 CAPSULE | Refills: 1 | Status: SHIPPED | OUTPATIENT
Start: 2024-02-02 | End: 2024-07-31

## 2024-02-02 ASSESSMENT — PAIN SCALES - GENERAL: PAINLEVEL: 0-NO PAIN

## 2024-02-02 NOTE — H&P
"History Of Present Illness  Reshma Francisco is a 48 y.o. woman here for 2 week follow up from [facility].  She is getting 64 oz of fluid per day.  She is getting 30 grams of protein per day.  She iss taking her omeprazole.     Past Medical History  Past Medical History:   Diagnosis Date    Depression     DM (diabetes mellitus) (CMS/HCC)     HTN (hypertension)        Surgical History  Past Surgical History:   Procedure Laterality Date    CYST REMOVAL  2021    states between breasts    GASTRIC BYPASS  01/24/2024    LAPAROSCOPIC GSTRCI BYPASS WITH HIATAL HERNIA REPAIR (TERA)    HYSTERECTOMY  2018        Social History  She reports that she has never smoked. She has never been exposed to tobacco smoke. She has never used smokeless tobacco. She reports that she does not currently use alcohol. She reports that she does not use drugs.    Family History  Family History   Problem Relation Name Age of Onset    Other (thyroid growth) Mother      Throat cancer Mother      Other (morbid obesity) Mother      Other (pacemaker) Father      Other (blood clots after pneumonia) Father      Pulmonary embolism Other Grandfather 80        on blood thinner for 6 months,        Allergies  Shellfish containing products and Lovastatin    Review of Systems     Physical Exam   Abdomen soft  Incisions healing  Last Recorded Vitals  Blood pressure 140/76, pulse 88, height 1.676 m (5' 6\"), weight 124 kg (273 lb).         Assessment/Plan   Problem List Items Addressed This Visit             ICD-10-CM       Endocrine/Metabolic    S/P bariatric surgery - Primary Z98.84    Relevant Medications    ursodiol (ActigalL) 300 mg capsule       Advance diet.             Mike Luke MD    "

## 2024-02-02 NOTE — PROGRESS NOTES
2 week Post-op Appointment - Dietary Follow Up: R[facility]    Current Weight: 273 lb  Current BMI: 44.06    Reviewed pureed diet progression. Patient was instructed to start purees on Thursday, February 8th. I reviewed how to blend foods appropriately to one consistency using a . Pureed food examples and recipes were provided in packet, reviewed foods to avoid at this time. Informed patient to measure out portion sizes and track volume size. Informed patient to aim for at least 60g of protein per day and to add protein shake as needed. Encouraged continuing to prioritize fluids and ensure getting in at least 50-60 oz fluid daily. Reviewed the importance of slowly eating and stop when starting to feel full. Patient was instructed to follow pureed diet for a full 2 weeks, and to not progress diet until instructed to do so. Pureed education packet was given, patient shows good understanding.       Vanda Jin RD, LDN  Registered Dietitian, Licensed Dietitian Nutritionist

## 2024-02-16 ENCOUNTER — NUTRITION (OUTPATIENT)
Dept: SURGERY | Facility: CLINIC | Age: 49
End: 2024-02-16
Payer: COMMERCIAL

## 2024-02-16 VITALS — WEIGHT: 265 LBS | BODY MASS INDEX: 42.77 KG/M2

## 2024-02-16 NOTE — PROGRESS NOTES
4 week Post-op Appointment - Dietary Follow Up: R[facility]    Current Weight: 265 lb  Current BMI: 42.77    Have you received IV fluids since surgery? No   Have you been to the ER since surgery? (If so, where and what date) No   Hospital readmission since surgery? No   Have you been diagnosed with COVID since surgery? No   Do you have any difficulty swallowing? No   Nausea? No   Vomiting? Reshma reports that 2 days ago she drank water too fast and it came up.    Pain? No   Discomfort? No   Intolerances? No   Heartburn? No   Are you taking your PPI? Are you opening the capsule? Yes   Do you have a gallbladder? Yes   Are you taking ursodiol as directed? Is it crushed? Yes   Are you on lovenox? Or were you started on a blood thinner for diagnosed DVT? No   Are you walking every hour? At least every 2 hours per pt.     Fluid Intake (ounces and sources): 57 oz/day; Gatorade Zero, water, protein shake   Protein Intake (grams and sources): 60 grams/day; pureed tuna, protein shake, greek yogurt, pureed chili   Volume of pureed tolerated: 1/2 cup   Are you taking your chewable MVI as directed? Yes     Reviewed soft food diet progression. Patient was instructed to start soft food stage 1 on Thursday February 22. I reviewed which foods patient can start to incorporate and which foods to avoid. Instructed to start soft food stage 2 on Thursday February 29. Informed patient to measure out portion sizes and track volume size. Informed patient to aim for at least 60g of protein per day and to add protein shake as needed. Encouraged continuing to prioritize fluids and ensure getting in at least 50-60 oz fluid daily. Reviewed the importance of slowly eating and stop when starting to feel full. Patient was instructed to follow the soft diet for a full 2 weeks, and to not progress diet until instructed to do so. Soft food education packet was given, patient shows good understanding.           Vanda Jin RD, LDN  Registered  Dietitian, Licensed Dietitian Nutritionist

## 2024-02-28 DIAGNOSIS — K90.9 INTESTINAL MALABSORPTION, UNSPECIFIED TYPE (HHS-HCC): ICD-10-CM

## 2024-02-28 DIAGNOSIS — E53.8 VITAMIN B 12 DEFICIENCY: ICD-10-CM

## 2024-02-28 DIAGNOSIS — E55.9 VITAMIN D DEFICIENCY: ICD-10-CM

## 2024-02-28 NOTE — PROGRESS NOTES
"6 WK FUV RYGB  START WT:304  IDEAL WT:155     START EXCESS:  149 TOTAL WT LOSS:    43      EWL:  29    % HT: 66 IN  Chief Complaints:         1. PBS:6 wk f/u.          HPI:     General Comments:          Do you have any difficulties with:  swallowing?  No, nausea?  No, vomiting?  yes pain?  No, heartburn?  No, discomfort?  No, intolerances?  yes fish and vegetables   Fluid and Protein Intake:    Reviewed fluid and protein log:  Yes,   Fluid intake (ounces):       61     Sources:,   Protein intake (grams):        60  Sources:.   Daily Diet Recall: ----. Volume of food at a time: soft. Chewable MVI:  Taking as directed?  Yes. PPI/omeprazole:  Taking as directed/prescribed?  Yes. Walking:  Every hour?  Yes. Ursodiol:  Taking as directed/prescribed?  Yes.      -Bariatric Follow-up:          Receive IV fluids  No. Seen in ER with admission No. Seen ER without admission No. Hospital readmission No.           Medical History:          Objective:       Assessment:        Assessment:    1. Surgery follow-up examination - Z09   2. H/O bariatric surgery - Z98.84   3. 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 Vitamin D deficiency, unspecified - E55.9   5. B12 deficiency - E53.8   6. Hyperparathyroidism - E21.3      Plan:        1. Others    Notes:  Advance diet per protocol  May swallow whole pills  Reviewed the \"rules\" for long-term weight loss success  Continue PPI, MVI with Fe  Begin micronutrient supplement protocol/handout given  Activity restrictions lifted  Discussed the importance of regular exercise for continued long-term weight loss success  .              Preventive Medicine:      Counseling:  Medication education:  Education:  All new and/or current medications discussed and reviewed including side effects with patient/caregiver, Understanding:  Caregiver/Patient expressed understanding., Adherence:  Barriers to adherence identified and discussed if present. BMI Care goal follow up plan:  Above normal BMI " management provided  Dietary management education, guidance, and counseling.           Follow Up: 6 Weeks

## 2024-03-01 ENCOUNTER — NUTRITION (OUTPATIENT)
Dept: SURGERY | Facility: CLINIC | Age: 49
End: 2024-03-01

## 2024-03-01 ENCOUNTER — OFFICE VISIT (OUTPATIENT)
Dept: SURGERY | Facility: CLINIC | Age: 49
End: 2024-03-01
Payer: COMMERCIAL

## 2024-03-01 ENCOUNTER — APPOINTMENT (OUTPATIENT)
Dept: SURGERY | Facility: CLINIC | Age: 49
End: 2024-03-01
Payer: COMMERCIAL

## 2024-03-01 VITALS
SYSTOLIC BLOOD PRESSURE: 139 MMHG | DIASTOLIC BLOOD PRESSURE: 78 MMHG | HEIGHT: 66 IN | HEART RATE: 82 BPM | WEIGHT: 261 LBS | BODY MASS INDEX: 41.95 KG/M2

## 2024-03-01 DIAGNOSIS — Z98.84 S/P BARIATRIC SURGERY: Primary | ICD-10-CM

## 2024-03-01 DIAGNOSIS — I50.9 CONGESTIVE HEART FAILURE, UNSPECIFIED HF CHRONICITY, UNSPECIFIED HEART FAILURE TYPE (MULTI): ICD-10-CM

## 2024-03-01 PROCEDURE — 3078F DIAST BP <80 MM HG: CPT | Performed by: SURGERY

## 2024-03-01 PROCEDURE — 3008F BODY MASS INDEX DOCD: CPT | Performed by: SURGERY

## 2024-03-01 PROCEDURE — 1036F TOBACCO NON-USER: CPT | Performed by: SURGERY

## 2024-03-01 PROCEDURE — 4010F ACE/ARB THERAPY RXD/TAKEN: CPT | Performed by: SURGERY

## 2024-03-01 PROCEDURE — 99024 POSTOP FOLLOW-UP VISIT: CPT | Performed by: SURGERY

## 2024-03-01 PROCEDURE — 3075F SYST BP GE 130 - 139MM HG: CPT | Performed by: SURGERY

## 2024-03-01 RX ORDER — FAMOTIDINE 40 MG/1
40 TABLET, FILM COATED ORAL 2 TIMES DAILY
Qty: 180 TABLET | Refills: 3 | Status: SHIPPED | OUTPATIENT
Start: 2024-03-01 | End: 2025-03-01

## 2024-03-01 RX ORDER — CLOPIDOGREL BISULFATE 75 MG/1
75 TABLET ORAL DAILY
Qty: 90 TABLET | Refills: 1 | Status: SHIPPED | OUTPATIENT
Start: 2024-03-01 | End: 2024-08-28

## 2024-03-01 ASSESSMENT — PAIN SCALES - GENERAL: PAINLEVEL: 0-NO PAIN

## 2024-03-01 NOTE — PROGRESS NOTES
6 week Post-op Appointment - Dietary Follow Up: RYGB    Current Weight: 261 lb  Current BMI: 42.13    Reshma reports that she tried baked fish (tilapia, cod and tuna) during the soft food phase. Reshma reports that she had ~3 bites and then threw up. Reshma also tried 1 and a half baked chicken wings and threw up. I instructed Reshma to avoid triggering foods for at least a week. I also instructed Reshma to only have 2 bites total of her meal. Reshma was instructed to not consume chicken wings at this time. I recommended Reshma try 2 bites of canned chicken. Reshma reports that she is consuming at least 60 oz of fluids per day.     Reviewed 6 week diet progression. I reviewed which foods patient can start to incorporate and which foods to avoid. I reviewed timeline with patient over the next 6 weeks on which foods can be reintroduced back into the diet. Informed patient to measure out portion sizes and track volume size. Informed patient to aim for at least 60g of protein per day and continue to prioritize fluids and ensure getting in at least 50-60 oz fluid daily. Reviewed the importance of slowly eating and stop when starting to feel full. 6 week diet education packet was given. Reviewed Vitamin and Mineral supplementation to start taking at 6 weeks post-op in addition to daily MVI. Patient shows good understanding.         Vanda Jin RD, LDN  Registered Dietitian, Licensed Dietitian Nutritionist

## 2024-03-01 NOTE — H&P
"History Of Present Illness  Reshma Francisco is a 48 y.o. woman here for 6 week follow up.  She is having difficulty with transition to solids.  She is eating too much volume and is vomiting.  She is not having difficulty with thinner textures.  She is taking a mvi.  I gave her a handout and told her to add calcium citrate to her regimen.  She was reminded to take elaine and omeprazole.  It is unclear if she has been taking her ppi and elaine as instructed.  I handwrote instructions and she is to call us.  She was told to stop her aspirin and start on plavix.  I asked Reshma to avoid ASA and NSAIDs for life explaining they put her at risk for an ulcer.     Past Medical History  Past Medical History:   Diagnosis Date    Depression     DM (diabetes mellitus) (CMS/[facility])     HTN (hypertension)        Surgical History  Past Surgical History:   Procedure Laterality Date    CYST REMOVAL  2021    states between breasts    GASTRIC BYPASS  01/24/2024    LAPAROSCOPIC GSTRCI BYPASS WITH HIATAL HERNIA REPAIR ([facility])    HYSTERECTOMY  2018        Social History  She reports that she has never smoked. She has never been exposed to tobacco smoke. She has never used smokeless tobacco. She reports that she does not currently use alcohol. She reports that she does not use drugs.    Family History  Family History   Problem Relation Name Age of Onset    Other (thyroid growth) Mother      Throat cancer Mother      Other (morbid obesity) Mother      Other (pacemaker) Father      Other (blood clots after pneumonia) Father      Pulmonary embolism Other Grandfather 80        on blood thinner for 6 months,        Allergies  Shellfish containing products and Lovastatin       Last Recorded Vitals  Blood pressure 139/78, pulse 82, height 1.676 m (5' 6\"), weight 118 kg (261 lb).      Assessment/Plan   Problem List Items Addressed This Visit             ICD-10-CM       Cardiac and Vasculature    CHF (congestive heart failure) (CMS/[facility]) I50.9    " "Relevant Medications    clopidogrel (Plavix) 75 mg tablet    famotidine (Pepcid) 40 mg tablet       Endocrine/Metabolic    S/P bariatric surgery - Primary Z98.84       Advance diet per protocol  May swallow whole pills  Reviewed the \"rules\" for long-term weight loss success  Continue PPI, MVI with Fe  Begin micronutrient supplement protocol/handout given  Activity restrictions lifted  Discussed the importance of regular exercise for continued long-term weight loss success           Mike Luke MD    "

## 2024-03-10 NOTE — PROGRESS NOTES
Subjective      Chief Complaint   Patient presents with    2 month follow up          She underwent bariatric surgery we have cleared her for this.  She had a nuclear stress done about a year ago showing her ejection fraction was 40% and this has not been evaluated.  She is feeling well and no sob or chest discomfort.  No PND or orthopnea.  She has lost 50 lbs since the surgery.           ROS     Past Surgical History:   Procedure Laterality Date    CYST REMOVAL  2021    states between breasts    GASTRIC BYPASS  01/24/2024    LAPAROSCOPIC GSTRCI BYPASS WITH HIATAL HERNIA REPAIR ([facility])    HYSTERECTOMY  2018        Active Ambulatory Problems     Diagnosis Date Noted    Anxiety 09/20/2023    Diabetes mellitus without complication (CMS/HCC) 09/20/2023    Essential hypertension 09/20/2023    Hyperlipidemia 09/20/2023    Hypersomnia 09/20/2023    Idiopathic osteoarthritis 09/20/2023    Morbid obesity (CMS/HCC) 09/20/2023    Obstructive sleep apnea 09/20/2023    Vitamin D deficiency 09/20/2023    Obesity, morbid (CMS/[facility]) 12/01/2023    Preop cardiovascular exam 01/15/2024    Morbid obesity with BMI of 40.0-44.9, adult (CMS/HCC) 01/24/2024    CHF (congestive heart failure) (CMS/HCC) 01/24/2024    Metabolic syndrome 01/24/2024    Gastroesophageal reflux disease with esophagitis 01/24/2024    S/P bariatric surgery 02/02/2024     Resolved Ambulatory Problems     Diagnosis Date Noted    Gastro-esophageal reflux disease without esophagitis 09/20/2023    Hiatal hernia 01/24/2024     Past Medical History:   Diagnosis Date    Depression     DM (diabetes mellitus) (CMS/[facility])     HTN (hypertension)         Visit Vitals  /74   Pulse 72   Wt 118 kg (261 lb)   SpO2 100%   BMI 42.13 kg/m²   OB Status Hysterectomy   Smoking Status Never   BSA 2.34 m²        Objective     Constitutional:       Appearance: Healthy appearance.   Eyes:      Pupils: Pupils are equal, round, and reactive to light.   Neck:      Vascular: JVD normal.  "  Pulmonary:      Breath sounds: Normal breath sounds.   Cardiovascular:      PMI at left midclavicular line. Normal rate. Regular rhythm. Normal S1. Normal S2.       Murmurs: There is no murmur.      No gallop.  No click. No rub.   Pulses:     Intact distal pulses.   Edema:     Peripheral edema absent.   Abdominal:      Palpations: Abdomen is soft.      Tenderness: There is no abdominal tenderness.   Musculoskeletal:      Extremities: No clubbing present.Skin:     General: Skin is warm and dry.   Neurological:      General: No focal deficit present.            Lab Review:         Lab Results   Component Value Date    CHOL 203 (H) 12/13/2019     Lab Results   Component Value Date    HDL 56 12/13/2019     Lab Results   Component Value Date    LDLCALC 111 12/13/2019     Lab Results   Component Value Date    TRIG 178 (H) 12/13/2019     No components found for: \"CHOLHDL\"     Assessment/Plan     No problem-specific Assessment & Plan notes found for this encounter.     "

## 2024-03-11 ENCOUNTER — OFFICE VISIT (OUTPATIENT)
Dept: CARDIOLOGY | Facility: CLINIC | Age: 49
End: 2024-03-11
Payer: COMMERCIAL

## 2024-03-11 VITALS
OXYGEN SATURATION: 100 % | HEART RATE: 72 BPM | SYSTOLIC BLOOD PRESSURE: 125 MMHG | BODY MASS INDEX: 42.13 KG/M2 | WEIGHT: 261 LBS | DIASTOLIC BLOOD PRESSURE: 74 MMHG

## 2024-03-11 DIAGNOSIS — I25.10 ASHD (ARTERIOSCLEROTIC HEART DISEASE): Primary | ICD-10-CM

## 2024-03-11 PROCEDURE — 99213 OFFICE O/P EST LOW 20 MIN: CPT | Performed by: INTERNAL MEDICINE

## 2024-03-11 PROCEDURE — 3008F BODY MASS INDEX DOCD: CPT | Performed by: INTERNAL MEDICINE

## 2024-03-11 PROCEDURE — 1036F TOBACCO NON-USER: CPT | Performed by: INTERNAL MEDICINE

## 2024-03-11 PROCEDURE — 3074F SYST BP LT 130 MM HG: CPT | Performed by: INTERNAL MEDICINE

## 2024-03-11 PROCEDURE — 4010F ACE/ARB THERAPY RXD/TAKEN: CPT | Performed by: INTERNAL MEDICINE

## 2024-03-11 PROCEDURE — 3078F DIAST BP <80 MM HG: CPT | Performed by: INTERNAL MEDICINE

## 2024-03-11 ASSESSMENT — PAIN SCALES - GENERAL: PAINLEVEL: 0-NO PAIN

## 2024-03-11 ASSESSMENT — ENCOUNTER SYMPTOMS
DEPRESSION: 0
LOSS OF SENSATION IN FEET: 0
OCCASIONAL FEELINGS OF UNSTEADINESS: 0

## 2024-03-26 ENCOUNTER — HOSPITAL ENCOUNTER (OUTPATIENT)
Dept: CARDIOLOGY | Facility: HOSPITAL | Age: 49
Discharge: HOME | End: 2024-03-26
Payer: COMMERCIAL

## 2024-03-26 DIAGNOSIS — I25.10 ASHD (ARTERIOSCLEROTIC HEART DISEASE): Primary | ICD-10-CM

## 2024-03-26 DIAGNOSIS — I25.10 ASHD (ARTERIOSCLEROTIC HEART DISEASE): ICD-10-CM

## 2024-03-26 PROCEDURE — 93306 TTE W/DOPPLER COMPLETE: CPT | Performed by: STUDENT IN AN ORGANIZED HEALTH CARE EDUCATION/TRAINING PROGRAM

## 2024-03-26 PROCEDURE — 2500000004 HC RX 250 GENERAL PHARMACY W/ HCPCS (ALT 636 FOR OP/ED): Performed by: INTERNAL MEDICINE

## 2024-03-26 PROCEDURE — 93306 TTE W/DOPPLER COMPLETE: CPT

## 2024-03-26 RX ADMIN — PERFLUTREN 2 ML OF DILUTION: 6.52 INJECTION, SUSPENSION INTRAVENOUS at 16:23

## 2024-03-29 LAB
AORTIC VALVE PEAK VELOCITY: 1.49 M/S
AV PEAK GRADIENT: 8.8 MMHG
AVA (PEAK VEL): 3.11 CM2
EJECTION FRACTION APICAL 4 CHAMBER: 57.2
EJECTION FRACTION: 53 %
LEFT ATRIUM VOLUME AREA LENGTH INDEX BSA: 35.1 ML/M2
LEFT VENTRICLE INTERNAL DIMENSION DIASTOLE: 5.44 CM (ref 3.5–6)
LEFT VENTRICULAR OUTFLOW TRACT DIAMETER: 2.34 CM
MITRAL VALVE E/A RATIO: 0.83
MITRAL VALVE E/E' RATIO: 11.87
RIGHT VENTRICLE FREE WALL PEAK S': 15 CM/S

## 2024-04-02 ENCOUNTER — TELEPHONE (OUTPATIENT)
Dept: SURGERY | Facility: CLINIC | Age: 49
End: 2024-04-02
Payer: COMMERCIAL

## 2024-04-02 NOTE — TELEPHONE ENCOUNTER
REPORTS NOSE BLEEDS   called office reports having nose bleeds with small clots. Patient instructed to notify pcp. Patient states understanding and agrees.

## 2024-04-04 ENCOUNTER — OFFICE VISIT (OUTPATIENT)
Dept: CARDIOLOGY | Facility: CLINIC | Age: 49
End: 2024-04-04
Payer: COMMERCIAL

## 2024-04-04 VITALS
OXYGEN SATURATION: 98 % | HEART RATE: 98 BPM | SYSTOLIC BLOOD PRESSURE: 140 MMHG | BODY MASS INDEX: 42.29 KG/M2 | WEIGHT: 262 LBS | DIASTOLIC BLOOD PRESSURE: 78 MMHG

## 2024-04-04 DIAGNOSIS — I10 ESSENTIAL HYPERTENSION: Primary | ICD-10-CM

## 2024-04-04 PROCEDURE — 3078F DIAST BP <80 MM HG: CPT | Performed by: INTERNAL MEDICINE

## 2024-04-04 PROCEDURE — 1036F TOBACCO NON-USER: CPT | Performed by: INTERNAL MEDICINE

## 2024-04-04 PROCEDURE — 3008F BODY MASS INDEX DOCD: CPT | Performed by: INTERNAL MEDICINE

## 2024-04-04 PROCEDURE — 3077F SYST BP >= 140 MM HG: CPT | Performed by: INTERNAL MEDICINE

## 2024-04-04 PROCEDURE — 4010F ACE/ARB THERAPY RXD/TAKEN: CPT | Performed by: INTERNAL MEDICINE

## 2024-04-04 RX ORDER — HYDROCHLOROTHIAZIDE 25 MG/1
25 TABLET ORAL DAILY
Qty: 90 TABLET | Refills: 3 | Status: SHIPPED | OUTPATIENT
Start: 2024-04-04 | End: 2025-04-04

## 2024-04-04 ASSESSMENT — PAIN SCALES - GENERAL: PAINLEVEL: 0-NO PAIN

## 2024-04-04 ASSESSMENT — ENCOUNTER SYMPTOMS
LOSS OF SENSATION IN FEET: 0
OCCASIONAL FEELINGS OF UNSTEADINESS: 0
DEPRESSION: 0

## 2024-04-04 NOTE — ASSESSMENT & PLAN NOTE
The EF is better than the nuclear study and is mildly down and feel due to the BP The BP is up today and will add hydrochlorothiazide and see in 6 months

## 2024-04-04 NOTE — PROGRESS NOTES
Subjective      Chief Complaint   Patient presents with    ECHO RESULT           She had the echocardiogram done it showed ejection fraction was low normal at 50 to 55% there were no regional wall motion abnormalities left atrium is mildly dilated.  The nuclear study and said the EF was in the 40% range.  She is feeling well and no sob. The BP is mildly up today and feel this is the cause of the low EF           ROS     Past Surgical History:   Procedure Laterality Date    CYST REMOVAL  2021    states between breasts    GASTRIC BYPASS  01/24/2024    LAPAROSCOPIC GSTRCI BYPASS WITH HIATAL HERNIA REPAIR ([facility]IE)    HYSTERECTOMY  2018        Active Ambulatory Problems     Diagnosis Date Noted    Anxiety 09/20/2023    Diabetes mellitus without complication (CMS/[facility]) 09/20/2023    Essential hypertension 09/20/2023    Hyperlipidemia 09/20/2023    Hypersomnia 09/20/2023    Idiopathic osteoarthritis 09/20/2023    Morbid obesity (CMS/[facility]) 09/20/2023    Obstructive sleep apnea 09/20/2023    Vitamin D deficiency 09/20/2023    Obesity, morbid (CMS/[facility]) 12/01/2023    Preop cardiovascular exam 01/15/2024    Morbid obesity with BMI of 40.0-44.9, adult (CMS/[facility]) 01/24/2024    CHF (congestive heart failure) (CMS/[facility]) 01/24/2024    Metabolic syndrome 01/24/2024    Gastroesophageal reflux disease with esophagitis 01/24/2024    S/P bariatric surgery 02/02/2024     Resolved Ambulatory Problems     Diagnosis Date Noted    Gastro-esophageal reflux disease without esophagitis 09/20/2023    Hiatal hernia 01/24/2024     Past Medical History:   Diagnosis Date    Depression     DM (diabetes mellitus) (CMS/[facility])     HTN (hypertension)         Visit Vitals  /78   Pulse 98   Wt 119 kg (262 lb)   SpO2 98%   BMI 42.29 kg/m²   OB Status Hysterectomy   Smoking Status Never   BSA 2.35 m²        Objective     Physical Exam       Lab Review:         Lab Results   Component Value Date    CHOL 203 (H) 12/13/2019     Lab Results   Component Value  "Date    HDL 56 12/13/2019     Lab Results   Component Value Date    LDLCALC 111 12/13/2019     Lab Results   Component Value Date    TRIG 178 (H) 12/13/2019     No components found for: \"CHOLHDL\"     Assessment/Plan     Essential hypertension  The EF is better than the nuclear study and is mildly down and feel due to the BP The BP is up today and will add hydrochlorothiazide and see in 6 months     "

## 2024-04-30 ENCOUNTER — LAB (OUTPATIENT)
Dept: LAB | Facility: LAB | Age: 49
End: 2024-04-30
Payer: COMMERCIAL

## 2024-04-30 DIAGNOSIS — K90.9 INTESTINAL MALABSORPTION, UNSPECIFIED TYPE (HHS-HCC): ICD-10-CM

## 2024-04-30 DIAGNOSIS — E53.8 VITAMIN B 12 DEFICIENCY: ICD-10-CM

## 2024-04-30 DIAGNOSIS — E55.9 VITAMIN D DEFICIENCY: ICD-10-CM

## 2024-04-30 LAB
25(OH)D3 SERPL-MCNC: 41 NG/ML (ref 31–100)
ALBUMIN SERPL-MCNC: 3.8 G/DL (ref 3.5–5)
ALP BLD-CCNC: 102 U/L (ref 35–125)
ALT SERPL-CCNC: 11 U/L (ref 5–40)
ANION GAP SERPL CALC-SCNC: 12 MMOL/L
AST SERPL-CCNC: 15 U/L (ref 5–40)
BASOPHILS # BLD AUTO: 0.03 X10*3/UL (ref 0–0.1)
BASOPHILS NFR BLD AUTO: 0.5 %
BILIRUB SERPL-MCNC: 0.6 MG/DL (ref 0.1–1.2)
BUN SERPL-MCNC: 14 MG/DL (ref 8–25)
CALCIUM SERPL-MCNC: 9.3 MG/DL (ref 8.5–10.4)
CHLORIDE SERPL-SCNC: 108 MMOL/L (ref 97–107)
CO2 SERPL-SCNC: 24 MMOL/L (ref 24–31)
CREAT SERPL-MCNC: 1 MG/DL (ref 0.4–1.6)
EGFRCR SERPLBLD CKD-EPI 2021: 68 ML/MIN/1.73M*2
EOSINOPHIL # BLD AUTO: 0.11 X10*3/UL (ref 0–0.7)
EOSINOPHIL NFR BLD AUTO: 1.8 %
ERYTHROCYTE [DISTWIDTH] IN BLOOD BY AUTOMATED COUNT: 12.2 % (ref 11.5–14.5)
FERRITIN SERPL-MCNC: 247 NG/ML (ref 13–150)
FOLATE SERPL-MCNC: >20 NG/ML (ref 4.2–19.9)
GLUCOSE SERPL-MCNC: 80 MG/DL (ref 65–99)
HCT VFR BLD AUTO: 30.5 % (ref 36–46)
HGB BLD-MCNC: 9.8 G/DL (ref 12–16)
IMM GRANULOCYTES # BLD AUTO: 0.01 X10*3/UL (ref 0–0.7)
IMM GRANULOCYTES NFR BLD AUTO: 0.2 % (ref 0–0.9)
IRON SATN MFR SERPL: 20 % (ref 12–50)
IRON SERPL-MCNC: 49 UG/DL (ref 30–160)
LYMPHOCYTES # BLD AUTO: 2.99 X10*3/UL (ref 1.2–4.8)
LYMPHOCYTES NFR BLD AUTO: 48.8 %
MCH RBC QN AUTO: 30.7 PG (ref 26–34)
MCHC RBC AUTO-ENTMCNC: 32.1 G/DL (ref 32–36)
MCV RBC AUTO: 96 FL (ref 80–100)
MONOCYTES # BLD AUTO: 0.42 X10*3/UL (ref 0.1–1)
MONOCYTES NFR BLD AUTO: 6.9 %
NEUTROPHILS # BLD AUTO: 2.57 X10*3/UL (ref 1.2–7.7)
NEUTROPHILS NFR BLD AUTO: 41.8 %
NRBC BLD-RTO: 0 /100 WBCS (ref 0–0)
PLATELET # BLD AUTO: 269 X10*3/UL (ref 150–450)
POTASSIUM SERPL-SCNC: 3.7 MMOL/L (ref 3.4–5.1)
PROT SERPL-MCNC: 7 G/DL (ref 5.9–7.9)
PTH-INTACT SERPL-MCNC: 68.4 PG/ML (ref 18.5–88)
RBC # BLD AUTO: 3.19 X10*6/UL (ref 4–5.2)
SODIUM SERPL-SCNC: 144 MMOL/L (ref 133–145)
TIBC SERPL-MCNC: 250 UG/DL (ref 228–428)
UIBC SERPL-MCNC: 201 UG/DL (ref 110–370)
VIT B12 SERPL-MCNC: 429 PG/ML (ref 211–946)
WBC # BLD AUTO: 6.1 X10*3/UL (ref 4.4–11.3)

## 2024-04-30 PROCEDURE — 82306 VITAMIN D 25 HYDROXY: CPT

## 2024-04-30 PROCEDURE — 82746 ASSAY OF FOLIC ACID SERUM: CPT

## 2024-04-30 PROCEDURE — 84630 ASSAY OF ZINC: CPT

## 2024-04-30 PROCEDURE — 83550 IRON BINDING TEST: CPT

## 2024-04-30 PROCEDURE — 82525 ASSAY OF COPPER: CPT

## 2024-04-30 PROCEDURE — 82607 VITAMIN B-12: CPT

## 2024-04-30 PROCEDURE — 36415 COLL VENOUS BLD VENIPUNCTURE: CPT

## 2024-04-30 PROCEDURE — 83540 ASSAY OF IRON: CPT

## 2024-04-30 PROCEDURE — 84425 ASSAY OF VITAMIN B-1: CPT

## 2024-04-30 PROCEDURE — 82728 ASSAY OF FERRITIN: CPT

## 2024-04-30 PROCEDURE — 80053 COMPREHEN METABOLIC PANEL: CPT

## 2024-04-30 PROCEDURE — 85025 COMPLETE CBC W/AUTO DIFF WBC: CPT

## 2024-04-30 PROCEDURE — 83970 ASSAY OF PARATHORMONE: CPT

## 2024-04-30 NOTE — PROGRESS NOTES
Subjective   Patient ID: Reshma Francisco is a 49 y.o. female who presents for No chief complaint on file..  HPI  3 MOS FUV RYGB  START WT:  304  IDEAL WT:  155   START EXCESS: 149    TOTAL WT LOSS: 54   EWL:    36  % HT:66  IN.    Review of Systems  Bariatric Surgery F/U:          Seen at ER after surgery? No.  Hospital admission? No.  Bariatric reoperation? No.  Bariatric intervention? No.  Was anticoagulation initiated for presumed/confirmed Vein Thrombosis/PE? No.  Was an incisional hernia noted on exam? No.  Sleep Apnea? YES  Still using C-PAP? YES.  GERD req. meds? No.  Hyperlipidemia? No.  Still taking Cholesterol med? No.  Hypertension? YES.  Still taking HTN med? YES  Number of Anti-hypertensive Medications: 3 Diabetes? YES Still taking DM med?YES  Current DM medication type: LEVEMIR___.         CONSTITUTIONAL:          Chills No.  Fatigue No.  Fever No.         CARDIOLOGY:          Negative for dizziness, chest pain, palpitations, shortness of breath.         RESPIRATORY:          Negative for chest congestion, cough, wheezing.         GASTROENTEROLOGY:          Food Intolerance No.  Acid reflux No.  Abdominal pain No.  Black stools No.  Constipation No.  Diarrhea No.  Loss of appetite no.  Nausea No.  Vomiting No.         UROLOGY:          Negative for dysuria, urinary urgency, kidney stones.         PSYCHOLOGY:          Negative for depression, anxiety, high stress level.   Objective   Physical Exam    Assessment/Plan            Azalia Ivy LPN 04/30/24 11:08 AM

## 2024-05-02 ENCOUNTER — OFFICE VISIT (OUTPATIENT)
Dept: SURGERY | Facility: CLINIC | Age: 49
End: 2024-05-02
Payer: COMMERCIAL

## 2024-05-02 ENCOUNTER — NUTRITION (OUTPATIENT)
Dept: SURGERY | Facility: CLINIC | Age: 49
End: 2024-05-02

## 2024-05-02 VITALS
DIASTOLIC BLOOD PRESSURE: 89 MMHG | SYSTOLIC BLOOD PRESSURE: 154 MMHG | BODY MASS INDEX: 40.18 KG/M2 | WEIGHT: 250 LBS | HEART RATE: 84 BPM | HEIGHT: 66 IN

## 2024-05-02 DIAGNOSIS — D50.8 IRON DEFICIENCY ANEMIA SECONDARY TO INADEQUATE DIETARY IRON INTAKE: ICD-10-CM

## 2024-05-02 DIAGNOSIS — E55.9 VITAMIN D DEFICIENCY: ICD-10-CM

## 2024-05-02 DIAGNOSIS — K90.9 INTESTINAL MALABSORPTION, UNSPECIFIED TYPE (HHS-HCC): Primary | ICD-10-CM

## 2024-05-02 DIAGNOSIS — Z98.84 S/P BARIATRIC SURGERY: ICD-10-CM

## 2024-05-02 PROBLEM — D50.9 IRON DEFICIENCY ANEMIA: Status: ACTIVE | Noted: 2024-05-02

## 2024-05-02 LAB
COPPER SERPL-MCNC: 116.8 UG/DL (ref 80–155)
ZINC SERPL-MCNC: 72.8 UG/DL (ref 60–120)

## 2024-05-02 PROCEDURE — 4010F ACE/ARB THERAPY RXD/TAKEN: CPT | Performed by: SURGERY

## 2024-05-02 PROCEDURE — 3008F BODY MASS INDEX DOCD: CPT | Performed by: SURGERY

## 2024-05-02 PROCEDURE — 3077F SYST BP >= 140 MM HG: CPT | Performed by: SURGERY

## 2024-05-02 PROCEDURE — 3079F DIAST BP 80-89 MM HG: CPT | Performed by: SURGERY

## 2024-05-02 PROCEDURE — 99214 OFFICE O/P EST MOD 30 MIN: CPT | Performed by: SURGERY

## 2024-05-02 ASSESSMENT — PAIN SCALES - GENERAL: PAINLEVEL: 0-NO PAIN

## 2024-05-02 NOTE — PROGRESS NOTES
Bariatric Post-Op Follow Up Appointment: 3 mos RYGB    Current Weight: 250 lb  Current BMI: 40.35  Percentage of weight loss achieved: 36    Dietary Intake: the lifelong rules   Breakfast- 2 hard boiled eggs and a greek yogurt, OR a scrambled egg and turkey sausage   Lunch- a chicken thigh, OR a salad with chicken, OR half a personal cauliflower pizza, OR rocío sausage and a greek yogurt   Dinner- mainly chicken and turkey   Volume of food at a time: ~2 oz  Snacks: [employer] protein chips, Ritz crackers 1x   Beverages: water, flavored water, decaf tea, decaf coffee, diet soda 1x   Alcohol Intake: 1 drink since surgery   Do you drink with your meals? No  Current Exercise: walking daily around apartment complex.   Vitamins/minerals: [id number] MVI 1x/day plus calcium citrate 2x/day   Food intolerances? Diet soda   Any decrease in energy levels? No, pt notes she has had an increase.   Any questions or concerns? No. Pt will begin to take [id number] MVI 1x/day due to low iron.       Vanda Jin RD, LDN  Registered Dietitian, Licensed Dietitian Nutritionist

## 2024-05-02 NOTE — H&P
History Of Present Illness  Reshma Francisco is a 49 y.o. woman here for 3 month follow up from RYGB.  She feels great.  She has no reflux.  She is taking pepcid.  She was told to stop her pepcid.  She is taking ursodiol.  She was reminded to take the elaine for another 3 months.  She was reminded to avoid ASA and NSAIDs for life due to risk of developing a gastrojejunal ulcer.  She is taking [id number], calcium citrate bid.  She obtained labwork for today.  She is anemic.  Her iron studies are normal.  Her meal volume is small.  She gets hungry at the end of the day.  She does not snack.     Past Medical History  Past Medical History:   Diagnosis Date    Depression     DM (diabetes mellitus) ([facility])     HTN (hypertension)        Surgical History  Past Surgical History:   Procedure Laterality Date    CYST REMOVAL  2021    states between breasts    GASTRIC BYPASS  01/24/2024    LAPAROSCOPIC GSTRCI BYPASS WITH HIATAL HERNIA REPAIR ([facility]IE)    HYSTERECTOMY  2018        Social History  She reports that she has never smoked. She has never been exposed to tobacco smoke. She has never used smokeless tobacco. She reports that she does not currently use alcohol. She reports that she does not use drugs.    Family History  Family History   Problem Relation Name Age of Onset    Other (thyroid growth) Mother      Throat cancer Mother      Other (morbid obesity) Mother      Other (pacemaker) Father      Other (blood clots after pneumonia) Father      Pulmonary embolism Other Grandfather 80        on blood thinner for 6 months,        Allergies  Shellfish containing products and Lovastatin    Review of Systems   Bariatric Surgery F/U:          Seen at ER after surgery? No.  Hospital admission? No.  Bariatric reoperation? No.  Bariatric intervention? No.  Was anticoagulation initiated for presumed/confirmed Vein Thrombosis/PE? No.  Was an incisional hernia noted on exam? No.  Sleep Apnea? No.  Still using C-PAP? No.  GERD req.  "meds? No.  Hyperlipidemia? No.  Still taking Cholesterol med? No.  Hypertension? No.  Still taking HTN med? No.  Number of Anti-hypertensive Medications: 0.  Diabetes? No.  Still taking DM med? No.  Current DM medication type: _____.         CONSTITUTIONAL:          Chills No.  Fatigue No.  Fever No.         CARDIOLOGY:          Negative for dizziness, chest pain, palpitations, shortness of breath.         RESPIRATORY:          Negative for chest congestion, cough, wheezing.         GASTROENTEROLOGY:          Food Intolerance No.  Acid reflux No.  Abdominal pain No.  Black stools No.  Constipation No.  Diarrhea No.  Loss of appetite no.  Nausea No.  Vomiting No.         UROLOGY:          Negative for dysuria, urinary urgency, kidney stones.         PSYCHOLOGY:          Negative for depression, anxiety, high stress level.      Physical Exam       General Examination:         GENERAL APPEARANCE: alert and oriented x 3, Pleasant and cooperative, No Acute Distress.          HEENT: PERRLA.          NECK: no lymphadenopathy, no thyromegaly, no JVD, normal flexion, normal extension.          HEART: regular rate and rhythm.          LUNGS: clear to auscultation bilaterally.          CHEST: normal shape and expansion.          ABDOMEN: no hernias present, soft and not tender, no guarding, no CVA tenderness.          EXTREMITIES: pulses 2 plus bilaterally, trace bilateral edema, no ulcerations.          SKIN: normal         NEUROLOGIC EXAM: CN's II-XII grossly intact, gait normal.          BACK: no CVA tenderness.       Last Recorded Vitals  Height 1.676 m (5' 6\"), weight 113 kg (250 lb).    Relevant Results   Latest Reference Range & Units 04/30/24 12:12   GLUCOSE 65 - 99 mg/dL 80   SODIUM 133 - 145 mmol/L 144   POTASSIUM 3.4 - 5.1 mmol/L 3.7   CHLORIDE 97 - 107 mmol/L 108 (H)   Bicarbonate 24 - 31 mmol/L 24   Anion Gap <=19 mmol/L 12   Blood Urea Nitrogen 8 - 25 mg/dL 14   Creatinine 0.40 - 1.60 mg/dL 1.00   EGFR >60 " mL/min/1.73m*2 68   Calcium 8.5 - 10.4 mg/dL 9.3   Albumin 3.5 - 5.0 g/dL 3.8   Alkaline Phosphatase 35 - 125 U/L 102   ALT 5 - 40 U/L 11   AST 5 - 40 U/L 15   Bilirubin Total 0.1 - 1.2 mg/dL 0.6   FERRITIN 13 - 150 ng/mL 247 (H)   FOLATE 4.2 - 19.9 ng/mL >20.0 (H)   Total Protein 5.9 - 7.9 g/dL 7.0   IRON 30 - 160 ug/dL 49   TIBC 228 - 428 ug/dL 250   UIBC 110 - 370 ug/dL 201   % Saturation 12 - 50 % 20   Vitamin B12 211 - 946 pg/mL 429   Parathyroid Hormone, Intact 18.5 - 88.0 pg/mL 68.4   Vitamin D, 25-Hydroxy, Total 31 - 100 ng/mL 41   LEUKOCYTES (10*3/UL) IN BLOOD BY AUTOMATED COUNT, [place] 4.4 - 11.3 x10*3/uL 6.1   nRBC 0.0 - 0.0 /100 WBCs 0.0   ERYTHROCYTES (10*6/UL) IN BLOOD BY AUTOMATED COUNT, [place] 4.00 - 5.20 x10*6/uL 3.19 (L)   HEMOGLOBIN 12.0 - 16.0 g/dL 9.8 (L)   HEMATOCRIT 36.0 - 46.0 % 30.5 (L)   MCV 80 - 100 fL 96   MCH 26.0 - 34.0 pg 30.7   MCHC 32.0 - 36.0 g/dL 32.1   RED CELL DISTRIBUTION WIDTH 11.5 - 14.5 % 12.2   PLATELETS (10*3/UL) IN BLOOD AUTOMATED COUNT, [place] 150 - 450 x10*3/uL 269   NEUTROPHILS/100 LEUKOCYTES IN BLOOD BY AUTOMATED COUNT, [place] 40.0 - 80.0 % 41.8   Immature Granulocytes %, Automated 0.0 - 0.9 % 0.2   Lymphocytes % 13.0 - 44.0 % 48.8   Monocytes % 2.0 - 10.0 % 6.9   Eosinophils % 0.0 - 6.0 % 1.8   Basophils % 0.0 - 2.0 % 0.5   NEUTROPHILS (10*3/UL) IN BLOOD BY AUTOMATED COUNT, [place] 1.20 - 7.70 x10*3/uL 2.57   Immature Granulocytes Absolute, Automated 0.00 - 0.70 x10*3/uL 0.01   Lymphocytes Absolute 1.20 - 4.80 x10*3/uL 2.99   Monocytes Absolute 0.10 - 1.00 x10*3/uL 0.42   Eosinophils Absolute 0.00 - 0.70 x10*3/uL 0.11   Basophils Absolute 0.00 - 0.10 x10*3/uL 0.03   (H): Data is abnormally high  (L): Data is abnormally low     Assessment/Plan   Problem List Items Addressed This Visit             ICD-10-CM    Vitamin D deficiency E55.9    Relevant Orders    Vitamin D 25-Hydroxy,Total (for eval of Vitamin D levels)    S/P bariatric surgery Z98.84     Malabsorption (HHS-HCC) - Primary K90.9    Relevant Orders    CBC and Auto Differential    Comprehensive Metabolic Panel    Copper, Blood    Ferritin    Folate    Iron and TIBC    Parathyroid Hormone, Intact    Vitamin B1, Whole Blood    Vitamin B12    Vitamin D 25-Hydroxy,Total (for eval of Vitamin D levels)    Zinc, Serum or Plasma    Iron deficiency anemia D50.9    Relevant Orders    CBC and Auto Differential    Iron and TIBC       We reviewed the rules necessary for long term success after weight loss surgery.  We discussed the need for lifelong nutritional supplementation after weight loss surgery and they were given a handout.  She is anemic.  We will switch her to celebrate 43 and refer her for a colonoscopy as it has been 15 years since her last colonoscopy.  We will repeat her labwork at next visit.  We discussed the importance of excercising with moderate intensity a minimum of five days per week for at least 30 minutes.         Mike Luke MD

## 2024-05-03 RX ORDER — BISMUTH SUBSALICYLATE 262 MG
1 TABLET,CHEWABLE ORAL DAILY
COMMUNITY
End: 2024-05-03 | Stop reason: WASHOUT

## 2024-05-03 RX ORDER — IBUPROFEN 200 MG
2 CAPSULE ORAL DAILY
COMMUNITY

## 2024-05-05 LAB — VIT B1 PYROPHOSHATE BLD-SCNC: 74 NMOL/L (ref 70–180)

## 2024-07-19 ENCOUNTER — LAB (OUTPATIENT)
Dept: LAB | Facility: LAB | Age: 49
End: 2024-07-19
Payer: COMMERCIAL

## 2024-07-19 DIAGNOSIS — E55.9 VITAMIN D DEFICIENCY: ICD-10-CM

## 2024-07-19 DIAGNOSIS — K90.9 INTESTINAL MALABSORPTION, UNSPECIFIED TYPE (HHS-HCC): ICD-10-CM

## 2024-07-19 DIAGNOSIS — D50.8 IRON DEFICIENCY ANEMIA SECONDARY TO INADEQUATE DIETARY IRON INTAKE: ICD-10-CM

## 2024-07-19 LAB
25(OH)D3 SERPL-MCNC: 37 NG/ML (ref 31–100)
ALBUMIN SERPL-MCNC: 4.1 G/DL (ref 3.5–5)
ALP BLD-CCNC: 112 U/L (ref 35–125)
ALT SERPL-CCNC: 12 U/L (ref 5–40)
ANION GAP SERPL CALC-SCNC: 11 MMOL/L
AST SERPL-CCNC: 14 U/L (ref 5–40)
BASOPHILS # BLD AUTO: 0.03 X10*3/UL (ref 0–0.1)
BASOPHILS NFR BLD AUTO: 0.5 %
BILIRUB SERPL-MCNC: 0.9 MG/DL (ref 0.1–1.2)
BUN SERPL-MCNC: 21 MG/DL (ref 8–25)
CALCIUM SERPL-MCNC: 9.7 MG/DL (ref 8.5–10.4)
CHLORIDE SERPL-SCNC: 103 MMOL/L (ref 97–107)
CO2 SERPL-SCNC: 29 MMOL/L (ref 24–31)
CREAT SERPL-MCNC: 1.1 MG/DL (ref 0.4–1.6)
EGFRCR SERPLBLD CKD-EPI 2021: 61 ML/MIN/1.73M*2
EOSINOPHIL # BLD AUTO: 0.07 X10*3/UL (ref 0–0.7)
EOSINOPHIL NFR BLD AUTO: 1.2 %
ERYTHROCYTE [DISTWIDTH] IN BLOOD BY AUTOMATED COUNT: 12.2 % (ref 11.5–14.5)
FERRITIN SERPL-MCNC: 261 NG/ML (ref 13–150)
FOLATE SERPL-MCNC: >20 NG/ML (ref 4.2–19.9)
GLUCOSE SERPL-MCNC: 105 MG/DL (ref 65–99)
HCT VFR BLD AUTO: 32.9 % (ref 36–46)
HGB BLD-MCNC: 10.6 G/DL (ref 12–16)
IMM GRANULOCYTES # BLD AUTO: 0.02 X10*3/UL (ref 0–0.7)
IMM GRANULOCYTES NFR BLD AUTO: 0.3 % (ref 0–0.9)
IRON SATN MFR SERPL: 30 % (ref 12–50)
IRON SERPL-MCNC: 95 UG/DL (ref 30–160)
LYMPHOCYTES # BLD AUTO: 3.13 X10*3/UL (ref 1.2–4.8)
LYMPHOCYTES NFR BLD AUTO: 51.8 %
MCH RBC QN AUTO: 31.2 PG (ref 26–34)
MCHC RBC AUTO-ENTMCNC: 32.2 G/DL (ref 32–36)
MCV RBC AUTO: 97 FL (ref 80–100)
MONOCYTES # BLD AUTO: 0.38 X10*3/UL (ref 0.1–1)
MONOCYTES NFR BLD AUTO: 6.3 %
NEUTROPHILS # BLD AUTO: 2.41 X10*3/UL (ref 1.2–7.7)
NEUTROPHILS NFR BLD AUTO: 39.9 %
NRBC BLD-RTO: 0 /100 WBCS (ref 0–0)
PLATELET # BLD AUTO: 315 X10*3/UL (ref 150–450)
POTASSIUM SERPL-SCNC: 4.7 MMOL/L (ref 3.4–5.1)
PROT SERPL-MCNC: 7.4 G/DL (ref 5.9–7.9)
PTH-INTACT SERPL-MCNC: 76.3 PG/ML (ref 18.5–88)
RBC # BLD AUTO: 3.4 X10*6/UL (ref 4–5.2)
SODIUM SERPL-SCNC: 143 MMOL/L (ref 133–145)
TIBC SERPL-MCNC: 315 UG/DL (ref 228–428)
UIBC SERPL-MCNC: 220 UG/DL (ref 110–370)
VIT B12 SERPL-MCNC: 352 PG/ML (ref 211–946)
WBC # BLD AUTO: 6 X10*3/UL (ref 4.4–11.3)

## 2024-07-19 PROCEDURE — 82746 ASSAY OF FOLIC ACID SERUM: CPT

## 2024-07-19 PROCEDURE — 82607 VITAMIN B-12: CPT

## 2024-07-19 PROCEDURE — 82525 ASSAY OF COPPER: CPT

## 2024-07-19 PROCEDURE — 83550 IRON BINDING TEST: CPT

## 2024-07-19 PROCEDURE — 82306 VITAMIN D 25 HYDROXY: CPT

## 2024-07-19 PROCEDURE — 80053 COMPREHEN METABOLIC PANEL: CPT

## 2024-07-19 PROCEDURE — 84425 ASSAY OF VITAMIN B-1: CPT

## 2024-07-19 PROCEDURE — 85025 COMPLETE CBC W/AUTO DIFF WBC: CPT

## 2024-07-19 PROCEDURE — 82728 ASSAY OF FERRITIN: CPT

## 2024-07-19 PROCEDURE — 36415 COLL VENOUS BLD VENIPUNCTURE: CPT

## 2024-07-19 PROCEDURE — 83970 ASSAY OF PARATHORMONE: CPT

## 2024-07-19 PROCEDURE — 83540 ASSAY OF IRON: CPT

## 2024-07-19 PROCEDURE — 84630 ASSAY OF ZINC: CPT

## 2024-07-22 LAB
COPPER SERPL-MCNC: 104.3 UG/DL (ref 80–155)
ZINC SERPL-MCNC: 66.5 UG/DL (ref 60–120)

## 2024-07-22 NOTE — PROGRESS NOTES
Subjective   Patient ID: Reshma Francisco is a 49 y.o. female who presents for No chief complaint on file..  HPI  6 MOS FUV RYGB  START WT: 304 IDEAL WT: 155 START EXCESS WT: 149 HT: 66 IN  Review of Systems  Bariatric Surgery F/U:          Seen at ER after surgery? No.  Hospital admission? No.  Bariatric reoperation? No.  Bariatric intervention? No.  Was anticoagulation initiated for presumed/confirmed Vein Thrombosis/PE? No.  Was an incisional hernia noted on exam No.  Sleep Apnea? YES  Still using C-PAP?  YES GERD req. meds? No.  Hyperlipidemia?  YES Still taking Cholesterol med? YES  Hypertension?  YES  Still taking HTN med?  YES Number of Anti-hypertensive Medications: 2  Diabetes? YES  Still taking DM med? YES.  Current DM medication type: INSULIN/ BUT DECREASING_.         CONSTITUTIONAL:          Chills No.  Fatigue No.  Fever No.         CARDIOLOGY:          Negative for dizziness, chest pain, palpitations, shortness of breath.         RESPIRATORY:          Negative for chest congestion, cough, wheezing.         GASTROENTEROLOGY:          Food Intolerance No.  Acid reflux No.  Abdominal pain No.  Black stools No.  Constipation No.  Diarrhea YES.  Loss of appetite no.  Nausea No.  Vomiting No.         UROLOGY:          Negative for dysuria, urinary urgency, kidney stones.         PSYCHOLOGY:          Negative for depression, anxiety, high stress level.   Objective   Physical Exam    Assessment/Plan            Azalia Ivy LPN 07/22/24 12:25 PM

## 2024-07-24 LAB — VIT B1 PYROPHOSHATE BLD-SCNC: 91 NMOL/L (ref 70–180)

## 2024-07-25 ENCOUNTER — APPOINTMENT (OUTPATIENT)
Dept: SURGERY | Facility: CLINIC | Age: 49
End: 2024-07-25
Payer: COMMERCIAL

## 2024-07-25 VITALS
BODY MASS INDEX: 38.09 KG/M2 | HEART RATE: 82 BPM | WEIGHT: 237 LBS | SYSTOLIC BLOOD PRESSURE: 129 MMHG | HEIGHT: 66 IN | DIASTOLIC BLOOD PRESSURE: 70 MMHG

## 2024-07-25 DIAGNOSIS — E21.3 HYPERPARATHYROIDISM (MULTI): ICD-10-CM

## 2024-07-25 DIAGNOSIS — E53.8 VITAMIN B12 DEFICIENCY: ICD-10-CM

## 2024-07-25 DIAGNOSIS — E55.9 VITAMIN D DEFICIENCY: ICD-10-CM

## 2024-07-25 DIAGNOSIS — Z98.84 S/P BARIATRIC SURGERY: ICD-10-CM

## 2024-07-25 DIAGNOSIS — K90.9 INTESTINAL MALABSORPTION, UNSPECIFIED TYPE (HHS-HCC): Primary | ICD-10-CM

## 2024-07-25 PROCEDURE — 3078F DIAST BP <80 MM HG: CPT

## 2024-07-25 PROCEDURE — 3008F BODY MASS INDEX DOCD: CPT

## 2024-07-25 PROCEDURE — 99214 OFFICE O/P EST MOD 30 MIN: CPT

## 2024-07-25 PROCEDURE — 4010F ACE/ARB THERAPY RXD/TAKEN: CPT

## 2024-07-25 PROCEDURE — 3074F SYST BP LT 130 MM HG: CPT

## 2024-07-25 RX ORDER — MAGNESIUM 200 MG
1000 TABLET ORAL DAILY
Qty: 90 TABLET | Refills: 3 | Status: SHIPPED | OUTPATIENT
Start: 2024-07-25 | End: 2025-07-25

## 2024-07-25 RX ORDER — BISMUTH SUBSALICYLATE 262 MG
1 TABLET,CHEWABLE ORAL DAILY
COMMUNITY

## 2024-07-25 ASSESSMENT — COLUMBIA-SUICIDE SEVERITY RATING SCALE - C-SSRS
6. HAVE YOU EVER DONE ANYTHING, STARTED TO DO ANYTHING, OR PREPARED TO DO ANYTHING TO END YOUR LIFE?: NO
1. IN THE PAST MONTH, HAVE YOU WISHED YOU WERE DEAD OR WISHED YOU COULD GO TO SLEEP AND NOT WAKE UP?: NO
2. HAVE YOU ACTUALLY HAD ANY THOUGHTS OF KILLING YOURSELF?: NO

## 2024-07-25 ASSESSMENT — PATIENT HEALTH QUESTIONNAIRE - PHQ9
2. FEELING DOWN, DEPRESSED OR HOPELESS: NOT AT ALL
SUM OF ALL RESPONSES TO PHQ9 QUESTIONS 1 AND 2: 0
1. LITTLE INTEREST OR PLEASURE IN DOING THINGS: NOT AT ALL

## 2024-07-25 ASSESSMENT — PAIN SCALES - GENERAL: PAINLEVEL: 0-NO PAIN

## 2024-07-25 NOTE — PROGRESS NOTES
Bariatric Post-Op Follow Up Appointment: 6 mos RYGB    Current Weight: 237 lbs   Current BMI: 38.25  Percentage of weight loss achieved: 45    Dietary Intake: the lifelong rules   Breakfast- a bowl of oatmeal and fruit, OR an egg and 1 slice of toast   Lunch- usually a sandwich and fruit, OR a protein shake   Dinner- mostly chicken with a vegetable. sometimes added rice on the side   Volume of food at a time: ~2 cups per meal  Snacks: watermelon  Beverages: all sugar-free beverages   Alcohol Intake: none   Do you drink with your meals? No  Current Exercise: walking daily for 30 minutes   Vitamins/minerals: [id number] MVI 1x/day, calcium citrate 2x/day  Food intolerances? None   Any decrease in energy levels? No   Any questions or concerns? No       Vanda Jin RD, LDN  Registered Dietitian, Licensed Dietitian Nutritionist

## 2024-07-25 NOTE — PROGRESS NOTES
Subjective   Patient ID: Reshma Francisco is a 49 y.o. female who presents for Follow-up.  GINETTE Justice is here for her 6 month follow up after a RYGB. She has lost 45% of her excess weight. She denies reflux. She tells me that she did not stop her famotidine at her last appointment. She will feel hunger if she does not reach her protein goals. She tells me that is takes about 1/2 sandwhich before feeling full. She eats off the [employer]- and she can eat that before feeling full. She is walking 30 min everyday for exercise. She is taking celebrate 18 and calcium citrate bid.     Objective   Physical Exam  Constitutional:       Appearance: Normal appearance.   Eyes:      Pupils: Pupils are equal, round, and reactive to light.   Cardiovascular:      Rate and Rhythm: Normal rate.   Pulmonary:      Effort: Pulmonary effort is normal.   Abdominal:      Palpations: Abdomen is soft.   Musculoskeletal:         General: Normal range of motion.   Skin:     General: Skin is warm and dry.   Neurological:      General: No focal deficit present.      Mental Status: She is alert and oriented to person, place, and time.   Psychiatric:         Mood and Affect: Mood normal.        Latest Reference Range & Units 07/19/24 11:13   GLUCOSE 65 - 99 mg/dL 105 (H)   SODIUM 133 - 145 mmol/L 143   POTASSIUM 3.4 - 5.1 mmol/L 4.7   CHLORIDE 97 - 107 mmol/L 103   Bicarbonate 24 - 31 mmol/L 29   Anion Gap <=19 mmol/L 11   Blood Urea Nitrogen 8 - 25 mg/dL 21   Creatinine 0.40 - 1.60 mg/dL 1.10   EGFR >60 mL/min/1.73m*2 61   Calcium 8.5 - 10.4 mg/dL 9.7   Albumin 3.5 - 5.0 g/dL 4.1   Alkaline Phosphatase 35 - 125 U/L 112   ALT 5 - 40 U/L 12   AST 5 - 40 U/L 14   Bilirubin Total 0.1 - 1.2 mg/dL 0.9   FERRITIN 13 - 150 ng/mL 261 (H)   FOLATE 4.2 - 19.9 ng/mL >20.0 (H)   Total Protein 5.9 - 7.9 g/dL 7.4   IRON 30 - 160 ug/dL 95   TIBC 228 - 428 ug/dL 315   UIBC 110 - 370 ug/dL 220   % Saturation 12 - 50 % 30   Vitamin B12 211 - 946 pg/mL 352    Copper 80.0 - 155.0 ug/dL 104.3   Zinc, Serum or Plasma 60.0 - 120.0 ug/dL 66.5   Vitamin B1, Whole Blood 70 - 180 nmol/L 91   Parathyroid Hormone, Intact 18.5 - 88.0 pg/mL 76.3   Vitamin D, 25-Hydroxy, Total 31 - 100 ng/mL 37   LEUKOCYTES (10*3/UL) IN BLOOD BY AUTOMATED COUNT, Japanese 4.4 - 11.3 x10*3/uL 6.0   nRBC 0.0 - 0.0 /100 WBCs 0.0   ERYTHROCYTES (10*6/UL) IN BLOOD BY AUTOMATED COUNT, Japanese 4.00 - 5.20 x10*6/uL 3.40 (L)   HEMOGLOBIN 12.0 - 16.0 g/dL 10.6 (L)   HEMATOCRIT 36.0 - 46.0 % 32.9 (L)   MCV 80 - 100 fL 97   MCH 26.0 - 34.0 pg 31.2   MCHC 32.0 - 36.0 g/dL 32.2   RED CELL DISTRIBUTION WIDTH 11.5 - 14.5 % 12.2   PLATELETS (10*3/UL) IN BLOOD AUTOMATED COUNT, Japanese 150 - 450 x10*3/uL 315   NEUTROPHILS/100 LEUKOCYTES IN BLOOD BY AUTOMATED COUNT, Japanese 40.0 - 80.0 % 39.9   Immature Granulocytes %, Automated 0.0 - 0.9 % 0.3   Lymphocytes % 13.0 - 44.0 % 51.8   Monocytes % 2.0 - 10.0 % 6.3   Eosinophils % 0.0 - 6.0 % 1.2   Basophils % 0.0 - 2.0 % 0.5   NEUTROPHILS (10*3/UL) IN BLOOD BY AUTOMATED COUNT, Japanese 1.20 - 7.70 x10*3/uL 2.41   Immature Granulocytes Absolute, Automated 0.00 - 0.70 x10*3/uL 0.02   Lymphocytes Absolute 1.20 - 4.80 x10*3/uL 3.13   Monocytes Absolute 0.10 - 1.00 x10*3/uL 0.38   Eosinophils Absolute 0.00 - 0.70 x10*3/uL 0.07   Basophils Absolute 0.00 - 0.10 x10*3/uL 0.03     Assessment/Plan   Problem List Items Addressed This Visit             ICD-10-CM    Vitamin D deficiency E55.9    Relevant Orders    Vitamin D 25-Hydroxy,Total (for eval of Vitamin D levels)    S/P bariatric surgery Z98.84    Malabsorption (HHS-HCC) - Primary K90.9    Relevant Orders    CBC and Auto Differential    Comprehensive Metabolic Panel    Copper, Blood    Ferritin    Folate    Iron and TIBC    Parathyroid Hormone, Intact    Vitamin B1, Whole Blood    Vitamin B12    Vitamin D 25-Hydroxy,Total (for eval of Vitamin D levels)    Zinc, Serum or Plasma    Vitamin B12 deficiency E53.8    Relevant  Medications    cyanocobalamin, vitamin B-12, 1,000 mcg tablet, sublingual    Other Relevant Orders    Vitamin B12    Hyperparathyroidism (Multi) E21.3    Relevant Orders    Parathyroid Hormone, Intact     Reshma is doing well with her weight loss. I reminded her that a RYGB is not compatible with ASA or NSAID use. Her vitamin B12 was on the low end of normal. She will start a sublingual B12. If her levels are still low at her next follow up, we discussed that she would have to start monthly injections. We will repeat her labwork in 6 months.        Radha Burton, STONE-CNP 07/25/24 2:52 PM

## 2024-07-26 PROBLEM — E21.3 HYPERPARATHYROIDISM (MULTI): Status: ACTIVE | Noted: 2024-07-26

## 2024-07-26 PROBLEM — E53.8 VITAMIN B12 DEFICIENCY: Status: ACTIVE | Noted: 2024-07-26

## 2024-10-17 ENCOUNTER — APPOINTMENT (OUTPATIENT)
Dept: CARDIOLOGY | Facility: CLINIC | Age: 49
End: 2024-10-17
Payer: COMMERCIAL

## 2024-11-05 NOTE — PROGRESS NOTES
Subjective      Chief Complaint   Patient presents with    6 month follow up          She did have a nuclear stress test which was negative but did show the EF was 40% range.  Echocardiogram showed the EF is normal.  This was done in 2023.  She has undergone bariatric surgery.  She has hypertension.  She is feeling well  She is not complaining of chest discomfort.  No complaints of PND or orthopnea.  The legs are not swelling on her.  NO palpitaions.  She does walk 30 minutes a day           Review of Systems   Constitutional: Negative. Negative for chills and fever.   HENT: Negative.     Eyes: Negative.    Respiratory: Negative.  Negative for cough.    Endocrine: Negative.    Skin: Negative.    Musculoskeletal: Negative.  Negative for falls.   Gastrointestinal: Negative.    Genitourinary: Negative.    Neurological: Negative.    All other systems reviewed and are negative.       Past Surgical History:   Procedure Laterality Date    CYST REMOVAL  2021    states between breasts    GASTRIC BYPASS  01/24/2024    LAPAROSCOPIC GSTRCI BYPASS WITH HIATAL HERNIA REPAIR ([facility])    HYSTERECTOMY  2018        Active Ambulatory Problems     Diagnosis Date Noted    Anxiety 09/20/2023    Diabetes mellitus without complication (Multi) 09/20/2023    Essential hypertension 09/20/2023    Hyperlipidemia 09/20/2023    Hypersomnia 09/20/2023    Idiopathic osteoarthritis 09/20/2023    Morbid obesity (Multi) 09/20/2023    Obstructive sleep apnea 09/20/2023    Vitamin D deficiency 09/20/2023    Obesity, morbid (Multi) 12/01/2023    Preop cardiovascular exam 01/15/2024    Morbid obesity with BMI of 40.0-44.9, adult (Multi) 01/24/2024    CHF (congestive heart failure) 01/24/2024    Metabolic syndrome 01/24/2024    Gastroesophageal reflux disease with esophagitis 01/24/2024    S/P bariatric surgery 02/02/2024    Malabsorption ([facility]) 05/02/2024    Iron deficiency anemia 05/02/2024    Vitamin B12 deficiency 07/26/2024    Hyperparathyroidism  "(Multi) 07/26/2024     Resolved Ambulatory Problems     Diagnosis Date Noted    Gastro-esophageal reflux disease without esophagitis 09/20/2023    Hiatal hernia 01/24/2024     Past Medical History:   Diagnosis Date    Depression     DM (diabetes mellitus) (Multi)     HTN (hypertension)     Sleep apnea         Visit Vitals  /80   Pulse 78   Wt 108 kg (237 lb 8 oz)   SpO2 99%   BMI 38.33 kg/m²   OB Status Hysterectomy   Smoking Status Never   BSA 2.24 m²        Objective     Constitutional:       Appearance: Healthy appearance.   Neck:      Vascular: No JVR.   Pulmonary:      Effort: Pulmonary effort is normal.      Breath sounds: Normal breath sounds.   Cardiovascular:      PMI at left midclavicular line. Normal rate. Regular rhythm. Normal S1. Normal S2.       Murmurs: There is no murmur.      No gallop.  No click. No rub.   Pulses:     Intact distal pulses.   Abdominal:      Palpations: Abdomen is soft.   Musculoskeletal: Normal range of motion. Skin:     General: Skin is warm and dry.   Neurological:      General: No focal deficit present.            Lab Review:         Lab Results   Component Value Date    CHOL 203 (H) 12/13/2019     Lab Results   Component Value Date    HDL 56 12/13/2019     Lab Results   Component Value Date    LDLCALC 111 12/13/2019     Lab Results   Component Value Date    TRIG 178 (H) 12/13/2019     No components found for: \"CHOLHDL\"     Assessment/Plan     Essential hypertension  She is doing well and is active.  Since the bariatic surgery she has los 94lbs.  NO angina and no chf.  The BP is doing well  The chol has not been checked for sometime and will send her for it.  See in a year     "

## 2024-11-06 ENCOUNTER — OFFICE VISIT (OUTPATIENT)
Dept: CARDIOLOGY | Facility: CLINIC | Age: 49
End: 2024-11-06
Payer: COMMERCIAL

## 2024-11-06 VITALS
HEART RATE: 78 BPM | WEIGHT: 237.5 LBS | SYSTOLIC BLOOD PRESSURE: 132 MMHG | OXYGEN SATURATION: 99 % | BODY MASS INDEX: 38.33 KG/M2 | DIASTOLIC BLOOD PRESSURE: 80 MMHG

## 2024-11-06 DIAGNOSIS — I10 ESSENTIAL HYPERTENSION: Primary | ICD-10-CM

## 2024-11-06 PROCEDURE — 4010F ACE/ARB THERAPY RXD/TAKEN: CPT | Performed by: INTERNAL MEDICINE

## 2024-11-06 PROCEDURE — 3079F DIAST BP 80-89 MM HG: CPT | Performed by: INTERNAL MEDICINE

## 2024-11-06 PROCEDURE — 1036F TOBACCO NON-USER: CPT | Performed by: INTERNAL MEDICINE

## 2024-11-06 PROCEDURE — 99213 OFFICE O/P EST LOW 20 MIN: CPT | Performed by: INTERNAL MEDICINE

## 2024-11-06 PROCEDURE — 3075F SYST BP GE 130 - 139MM HG: CPT | Performed by: INTERNAL MEDICINE

## 2024-11-06 ASSESSMENT — ENCOUNTER SYMPTOMS
FALLS: 0
ENDOCRINE NEGATIVE: 1
FEVER: 0
EYES NEGATIVE: 1
RESPIRATORY NEGATIVE: 1
OCCASIONAL FEELINGS OF UNSTEADINESS: 0
CONSTITUTIONAL NEGATIVE: 1
GASTROINTESTINAL NEGATIVE: 1
LOSS OF SENSATION IN FEET: 1
NEUROLOGICAL NEGATIVE: 1
COUGH: 0
MUSCULOSKELETAL NEGATIVE: 1
CHILLS: 0
DEPRESSION: 0

## 2024-11-06 ASSESSMENT — PAIN SCALES - GENERAL: PAINLEVEL_OUTOF10: 0-NO PAIN

## 2024-11-06 NOTE — ASSESSMENT & PLAN NOTE
She is doing well and is active.  Since the bariatic surgery she has los 94lbs.  NO angina and no chf.  The BP is doing well  The chol has not been checked for sometime and will send her for it.  See in a year

## 2025-01-22 ENCOUNTER — TELEPHONE (OUTPATIENT)
Dept: SURGERY | Facility: CLINIC | Age: 50
End: 2025-01-22

## 2025-01-22 NOTE — PROGRESS NOTES
Subjective   Patient ID: Reshma Francisco is a 49 y.o. female who presents for No chief complaint on file..  HPI  1 YR FUV RYGB  HT: 66 IN  START WT: 304 IDEAL WT: 155 START EXCESS WT: 149 HT: 66 IN  LABS ARE DONE IN EPIC FOR REVIEW  Review of Systems  Bariatric Surgery F/U:          Seen at ER after surgery? No.  Hospital admission? No.  Bariatric reoperation? No.  Bariatric intervention? No.  Was anticoagulation initiated for presumed/confirmed Vein Thrombosis/PE? No.  Was an incisional hernia noted on exam No.  Sleep Apnea? YES  Still using C-PAP?  no GERD req. meds? No.  Hyperlipidemia?  noStill taking Cholesterol med? no  Hypertension?  YES  Still taking HTN med?  YES Number of Anti-hypertensive Medications: 2  Diabetes? YES  Still taking DM med? YES.  Current DM medication type: INSULIN/ BUT DECREASING_.         CONSTITUTIONAL:          Chills No.  Fatigue No.  Fever No.         CARDIOLOGY:          Negative for dizziness, chest pain, palpitations, shortness of breath.         RESPIRATORY:          Negative for chest congestion, cough, wheezing.         GASTROENTEROLOGY:          Food Intolerance No.  Acid reflux No.  Abdominal pain No.  Black stools No.  Constipation No.  Diarrhea no.  Loss of appetite no.  Nausea No.  Vomiting No.         UROLOGY:          Negative for dysuria, urinary urgency, kidney stones.         PSYCHOLOGY:          Negative for depression, anxiety, high stress level.   Objective   Physical Exam    Assessment/Plan            Azalia Ivy LPN 01/22/25 4:31 PM

## 2025-01-23 ENCOUNTER — LAB (OUTPATIENT)
Dept: LAB | Facility: LAB | Age: 50
End: 2025-01-23

## 2025-01-23 DIAGNOSIS — E53.8 VITAMIN B12 DEFICIENCY: ICD-10-CM

## 2025-01-23 DIAGNOSIS — E21.3 HYPERPARATHYROIDISM (MULTI): ICD-10-CM

## 2025-01-23 DIAGNOSIS — E55.9 VITAMIN D DEFICIENCY: ICD-10-CM

## 2025-01-23 DIAGNOSIS — K90.9 INTESTINAL MALABSORPTION, UNSPECIFIED TYPE (HHS-HCC): ICD-10-CM

## 2025-01-23 DIAGNOSIS — I10 ESSENTIAL HYPERTENSION: ICD-10-CM

## 2025-01-23 LAB
25(OH)D3 SERPL-MCNC: 45 NG/ML (ref 30–100)
ALBUMIN SERPL BCP-MCNC: 4.1 G/DL (ref 3.4–5)
ALP SERPL-CCNC: 85 U/L (ref 33–110)
ALT SERPL W P-5'-P-CCNC: 33 U/L (ref 7–45)
ANION GAP SERPL CALCULATED.3IONS-SCNC: 9 MMOL/L (ref 10–20)
AST SERPL W P-5'-P-CCNC: 22 U/L (ref 9–39)
BASOPHILS # BLD AUTO: 0.04 X10*3/UL (ref 0–0.1)
BASOPHILS NFR BLD AUTO: 0.6 %
BILIRUB SERPL-MCNC: 1.1 MG/DL (ref 0–1.2)
BUN SERPL-MCNC: 18 MG/DL (ref 6–23)
CALCIUM SERPL-MCNC: 9.3 MG/DL (ref 8.6–10.3)
CHLORIDE SERPL-SCNC: 107 MMOL/L (ref 98–107)
CHOLEST SERPL-MCNC: 175 MG/DL (ref 0–199)
CHOLEST/HDLC SERPL: 2.7 {RATIO}
CO2 SERPL-SCNC: 32 MMOL/L (ref 21–32)
CREAT SERPL-MCNC: 1.24 MG/DL (ref 0.5–1.05)
EGFRCR SERPLBLD CKD-EPI 2021: 53 ML/MIN/1.73M*2
EOSINOPHIL # BLD AUTO: 0.07 X10*3/UL (ref 0–0.7)
EOSINOPHIL NFR BLD AUTO: 1 %
ERYTHROCYTE [DISTWIDTH] IN BLOOD BY AUTOMATED COUNT: 11.8 % (ref 11.5–14.5)
FERRITIN SERPL-MCNC: 196 NG/ML (ref 8–150)
FOLATE SERPL-MCNC: >24 NG/ML
GLUCOSE SERPL-MCNC: 97 MG/DL (ref 74–99)
HCT VFR BLD AUTO: 33.3 % (ref 36–46)
HDLC SERPL-MCNC: 65.4 MG/DL
HGB BLD-MCNC: 10.9 G/DL (ref 12–16)
IMM GRANULOCYTES # BLD AUTO: 0.01 X10*3/UL (ref 0–0.7)
IMM GRANULOCYTES NFR BLD AUTO: 0.1 % (ref 0–0.9)
IRON SATN MFR SERPL: 25 % (ref 25–45)
IRON SERPL-MCNC: 82 UG/DL (ref 35–150)
LDLC SERPL CALC-MCNC: 85 MG/DL
LYMPHOCYTES # BLD AUTO: 3.04 X10*3/UL (ref 1.2–4.8)
LYMPHOCYTES NFR BLD AUTO: 44.9 %
MCH RBC QN AUTO: 31.2 PG (ref 26–34)
MCHC RBC AUTO-ENTMCNC: 32.7 G/DL (ref 32–36)
MCV RBC AUTO: 95 FL (ref 80–100)
MONOCYTES # BLD AUTO: 0.39 X10*3/UL (ref 0.1–1)
MONOCYTES NFR BLD AUTO: 5.8 %
NEUTROPHILS # BLD AUTO: 3.22 X10*3/UL (ref 1.2–7.7)
NEUTROPHILS NFR BLD AUTO: 47.6 %
NON HDL CHOLESTEROL: 110 MG/DL (ref 0–149)
NRBC BLD-RTO: 0 /100 WBCS (ref 0–0)
PLATELET # BLD AUTO: 324 X10*3/UL (ref 150–450)
POTASSIUM SERPL-SCNC: 3.9 MMOL/L (ref 3.5–5.3)
PROT SERPL-MCNC: 7.2 G/DL (ref 6.4–8.2)
RBC # BLD AUTO: 3.49 X10*6/UL (ref 4–5.2)
SODIUM SERPL-SCNC: 144 MMOL/L (ref 136–145)
TIBC SERPL-MCNC: 332 UG/DL (ref 240–445)
TRIGL SERPL-MCNC: 124 MG/DL (ref 0–149)
UIBC SERPL-MCNC: 250 UG/DL (ref 110–370)
VIT B12 SERPL-MCNC: 379 PG/ML (ref 211–911)
VLDL: 25 MG/DL (ref 0–40)
WBC # BLD AUTO: 6.8 X10*3/UL (ref 4.4–11.3)

## 2025-01-23 PROCEDURE — 83970 ASSAY OF PARATHORMONE: CPT

## 2025-01-23 PROCEDURE — 84425 ASSAY OF VITAMIN B-1: CPT

## 2025-01-23 PROCEDURE — 82306 VITAMIN D 25 HYDROXY: CPT

## 2025-01-23 PROCEDURE — 36415 COLL VENOUS BLD VENIPUNCTURE: CPT

## 2025-01-23 PROCEDURE — 83550 IRON BINDING TEST: CPT

## 2025-01-23 PROCEDURE — 82525 ASSAY OF COPPER: CPT

## 2025-01-23 PROCEDURE — 80061 LIPID PANEL: CPT

## 2025-01-23 PROCEDURE — 82728 ASSAY OF FERRITIN: CPT

## 2025-01-23 PROCEDURE — 80053 COMPREHEN METABOLIC PANEL: CPT

## 2025-01-23 PROCEDURE — 83540 ASSAY OF IRON: CPT

## 2025-01-23 PROCEDURE — 84630 ASSAY OF ZINC: CPT

## 2025-01-23 PROCEDURE — 82746 ASSAY OF FOLIC ACID SERUM: CPT

## 2025-01-23 PROCEDURE — 85025 COMPLETE CBC W/AUTO DIFF WBC: CPT

## 2025-01-23 PROCEDURE — 82607 VITAMIN B-12: CPT

## 2025-01-24 LAB — PTH-INTACT SERPL-MCNC: 67.3 PG/ML (ref 18.5–88)

## 2025-01-26 LAB
COPPER SERPL-MCNC: 122.9 UG/DL (ref 80–155)
ZINC SERPL-MCNC: 70.7 UG/DL (ref 60–120)

## 2025-01-27 LAB — VIT B1 PYROPHOSHATE BLD-SCNC: 82 NMOL/L (ref 70–180)

## 2025-01-28 ENCOUNTER — APPOINTMENT (OUTPATIENT)
Dept: SURGERY | Facility: CLINIC | Age: 50
End: 2025-01-28
Payer: COMMERCIAL

## 2025-01-28 ENCOUNTER — NUTRITION (OUTPATIENT)
Dept: SURGERY | Facility: CLINIC | Age: 50
End: 2025-01-28

## 2025-01-28 VITALS
BODY MASS INDEX: 37.93 KG/M2 | HEIGHT: 66 IN | SYSTOLIC BLOOD PRESSURE: 129 MMHG | WEIGHT: 236 LBS | HEART RATE: 85 BPM | DIASTOLIC BLOOD PRESSURE: 67 MMHG

## 2025-01-28 DIAGNOSIS — E55.9 VITAMIN D DEFICIENCY: ICD-10-CM

## 2025-01-28 DIAGNOSIS — E21.3 HYPERPARATHYROIDISM (MULTI): ICD-10-CM

## 2025-01-28 DIAGNOSIS — K90.9 INTESTINAL MALABSORPTION, UNSPECIFIED TYPE (HHS-HCC): Primary | ICD-10-CM

## 2025-01-28 DIAGNOSIS — Z98.84 S/P BARIATRIC SURGERY: ICD-10-CM

## 2025-01-28 DIAGNOSIS — E53.8 VITAMIN B12 DEFICIENCY: ICD-10-CM

## 2025-01-28 PROCEDURE — 3074F SYST BP LT 130 MM HG: CPT

## 2025-01-28 PROCEDURE — 3078F DIAST BP <80 MM HG: CPT

## 2025-01-28 PROCEDURE — 3008F BODY MASS INDEX DOCD: CPT

## 2025-01-28 PROCEDURE — 4010F ACE/ARB THERAPY RXD/TAKEN: CPT

## 2025-01-28 PROCEDURE — 99214 OFFICE O/P EST MOD 30 MIN: CPT

## 2025-01-28 PROCEDURE — 3048F LDL-C <100 MG/DL: CPT

## 2025-01-28 RX ORDER — CHOLECALCIFEROL (VITAMIN D3) 1250 MCG
1250 TABLET ORAL
COMMUNITY

## 2025-01-28 RX ORDER — INSULIN GLARGINE 100 [IU]/ML
INJECTION, SOLUTION SUBCUTANEOUS NIGHTLY
COMMUNITY

## 2025-01-28 ASSESSMENT — PATIENT HEALTH QUESTIONNAIRE - PHQ9
1. LITTLE INTEREST OR PLEASURE IN DOING THINGS: NOT AT ALL
SUM OF ALL RESPONSES TO PHQ9 QUESTIONS 1 AND 2: 0
2. FEELING DOWN, DEPRESSED OR HOPELESS: NOT AT ALL

## 2025-01-28 ASSESSMENT — PAIN SCALES - GENERAL: PAINLEVEL_OUTOF10: 0-NO PAIN

## 2025-01-28 NOTE — PROGRESS NOTES
Subjective   Patient ID: Reshma Francisco is a 49 y.o. female who presents for Follow-up (1 yr fuv rygb).  GINETTE Justice is here for her 1 year follow up after a RYGB. She has lost 45% of her excess weight. She denies reflux. She stopped her pepcid after her last appointment. She will feel hunger if she does not reach her protein goals. She can eat a pre portioned plate. She eats off the [facility] [employer]- and she can eat that before feeling full. She is walking 30 min everyday for exercise. She tells me she is moving to [place] after her next appointment. She is taking celebrate 18 and calcium citrate bid. She had labwork for this appointment.       Objective   Physical Exam  Constitutional:       Appearance: Normal appearance.   Eyes:      Pupils: Pupils are equal, round, and reactive to light.   Cardiovascular:      Rate and Rhythm: Normal rate.   Pulmonary:      Effort: Pulmonary effort is normal.   Abdominal:      Palpations: Abdomen is soft.   Musculoskeletal:         General: Normal range of motion.   Skin:     General: Skin is warm and dry.   Neurological:      General: No focal deficit present.      Mental Status: She is alert and oriented to person, place, and time.   Psychiatric:         Mood and Affect: Mood normal.          Latest Reference Range & Units 01/23/25 11:24   GLUCOSE 74 - 99 mg/dL 97   SODIUM 136 - 145 mmol/L 144   POTASSIUM 3.5 - 5.3 mmol/L 3.9   CHLORIDE 98 - 107 mmol/L 107   Bicarbonate 21 - 32 mmol/L 32   Anion Gap 10 - 20 mmol/L 9 (L)   Blood Urea Nitrogen 6 - 23 mg/dL 18   Creatinine 0.50 - 1.05 mg/dL 1.24 (H)   EGFR >60 mL/min/1.73m*2 53 (L)   Calcium 8.6 - 10.3 mg/dL 9.3   Albumin 3.4 - 5.0 g/dL 4.1   Alkaline Phosphatase 33 - 110 U/L 85   ALT 7 - 45 U/L 33   AST 9 - 39 U/L 22   Bilirubin Total 0.0 - 1.2 mg/dL 1.1   HDL CHOLESTEROL mg/dL 65.4   Cholesterol/HDL Ratio  2.7   LDL Calculated <=99 mg/dL 85   VLDL 0 - 40 mg/dL 25   TRIGLYCERIDES 0 - 149 mg/dL 124   Non HDL Cholesterol 0 - 149  mg/dL 110   FERRITIN 8 - 150 ng/mL 196 (H)   FOLATE >5.0 ng/mL >24.0   Total Protein 6.4 - 8.2 g/dL 7.2   IRON 35 - 150 ug/dL 82   CHOLESTEROL 0 - 199 mg/dL 175   TIBC 240 - 445 ug/dL 332   UIBC 110 - 370 ug/dL 250   % Saturation 25 - 45 % 25   Vitamin B12 211 - 911 pg/mL 379   Copper 80.0 - 155.0 ug/dL 122.9   Zinc, Serum or Plasma 60.0 - 120.0 ug/dL 70.7   Vitamin B1, Whole Blood 70 - 180 nmol/L 82   Parathyroid Hormone, Intact 18.5 - 88.0 pg/mL 67.3   Vitamin D, 25-Hydroxy, Total 30 - 100 ng/mL 45   WBC 4.4 - 11.3 x10*3/uL 6.8   nRBC 0.0 - 0.0 /100 WBCs 0.0   RBC 4.00 - 5.20 x10*6/uL 3.49 (L)   HEMOGLOBIN 12.0 - 16.0 g/dL 10.9 (L)   HEMATOCRIT 36.0 - 46.0 % 33.3 (L)   MCV 80 - 100 fL 95   MCH 26.0 - 34.0 pg 31.2   MCHC 32.0 - 36.0 g/dL 32.7   RED CELL DISTRIBUTION WIDTH 11.5 - 14.5 % 11.8   Platelets 150 - 450 x10*3/uL 324   Neutrophils % 40.0 - 80.0 % 47.6   Immature Granulocytes %, Automated 0.0 - 0.9 % 0.1   Lymphocytes % 13.0 - 44.0 % 44.9   Monocytes % 2.0 - 10.0 % 5.8   Eosinophils % 0.0 - 6.0 % 1.0   Basophils % 0.0 - 2.0 % 0.6   Neutrophils Absolute 1.20 - 7.70 x10*3/uL 3.22   Immature Granulocytes Absolute, Automated 0.00 - 0.70 x10*3/uL 0.01   Lymphocytes Absolute 1.20 - 4.80 x10*3/uL 3.04   Monocytes Absolute 0.10 - 1.00 x10*3/uL 0.39   Eosinophils Absolute 0.00 - 0.70 x10*3/uL 0.07   Basophils Absolute 0.00 - 0.10 x10*3/uL 0.04     Assessment/Plan   Problem List Items Addressed This Visit             ICD-10-CM    Vitamin D deficiency E55.9    Relevant Orders    Vitamin D 25-Hydroxy,Total (for eval of Vitamin D levels)    S/P bariatric surgery Z98.84    Malabsorption (HHS-HCC) - Primary K90.9    Relevant Orders    CBC and Auto Differential    Comprehensive Metabolic Panel    Copper, Blood    Ferritin    Folate    Iron and TIBC    Parathyroid Hormone, Intact    Vitamin B1, Whole Blood    Vitamin B12    Vitamin D 25-Hydroxy,Total (for eval of Vitamin D levels)    Zinc, Serum or Plasma    Vitamin B12  deficiency E53.8    Relevant Orders    Vitamin B12    Hyperparathyroidism (Multi) E21.3    Relevant Orders    Parathyroid Hormone, Intact     Lavera and I reviewed the lifelong rules. I reminded her that a RYGB is not compatible with ASA or NSAID use. I asked her to start a sublingual B12 due to hers being borderline low. She tells me she will buy some today in the office. She will continue her other supplementation. She will follow up with repeat labwork in 6 months.     Radha Burton, STONE-CNP 01/28/25 3:11 PM

## 2025-01-28 NOTE — PROGRESS NOTES
Bariatric Post-Op Follow Up Appointment: 1 yr RYGB    Current Weight: 236 lbs   Current BMI: 38.09  Percentage of weight loss achieved: 46    Reshma is present in the office for follow up. She reports overall doing well. She reports some food intolerance to keto bread and beef - which she reports both feel stuck/sit heavy. We reviewed avoiding those and/or making sure foods are chewed well. She reports staying hydrated throughout the day with water, sugar-free iced tea and hot plain tea. She is currently taking Celebrate 18 MVI 1x/day, and calcium citrate BID. She will begin to take a sublingual vitamin B12 and vitamin D 1x/week. She reports an increase in energy levels.     Plan: Reshma will follow up in 6 months.       Vanda Jin RD, LDN  Registered Dietitian, Licensed Dietitian Nutritionist

## 2025-01-31 ENCOUNTER — TELEPHONE (OUTPATIENT)
Dept: SURGERY | Facility: CLINIC | Age: 50
End: 2025-01-31
Payer: COMMERCIAL

## 2025-01-31 NOTE — TELEPHONE ENCOUNTER
call from provider Dr. Abadie phone:[phone] for Reshma Francisco/ will call back on Monday for the np/ this call was not urgent per Dr. Abadie.

## 2025-03-18 ENCOUNTER — TELEPHONE (OUTPATIENT)
Dept: SURGERY | Facility: CLINIC | Age: 50
End: 2025-03-18
Payer: COMMERCIAL

## 2025-03-18 NOTE — TELEPHONE ENCOUNTER
Reshma left a message with the answering service about vitamins. I attempted to call her back, but she did not answer. LVM

## 2025-05-30 DIAGNOSIS — G47.33 OBSTRUCTIVE SLEEP APNEA: ICD-10-CM

## 2025-05-30 DIAGNOSIS — I10 ESSENTIAL HYPERTENSION: Primary | ICD-10-CM

## 2025-05-30 DIAGNOSIS — K90.9 INTESTINAL MALABSORPTION, UNSPECIFIED TYPE (HHS-HCC): ICD-10-CM

## 2025-05-30 DIAGNOSIS — E78.00 PURE HYPERCHOLESTEROLEMIA: ICD-10-CM

## 2025-05-30 DIAGNOSIS — E53.8 VITAMIN B12 DEFICIENCY: ICD-10-CM

## 2025-05-30 DIAGNOSIS — E21.3 HYPERPARATHYROIDISM (MULTI): ICD-10-CM

## 2025-05-30 DIAGNOSIS — D50.8 OTHER IRON DEFICIENCY ANEMIA: ICD-10-CM

## 2025-05-30 DIAGNOSIS — E55.9 VITAMIN D DEFICIENCY: ICD-10-CM

## 2025-06-18 ENCOUNTER — APPOINTMENT (OUTPATIENT)
Dept: SURGERY | Facility: CLINIC | Age: 50
End: 2025-06-18
Payer: COMMERCIAL

## 2025-07-28 DIAGNOSIS — E55.9 VITAMIN D DEFICIENCY: ICD-10-CM

## 2025-07-28 DIAGNOSIS — E21.3 HYPERPARATHYROIDISM (MULTI): ICD-10-CM

## 2025-07-28 DIAGNOSIS — E53.8 VITAMIN B12 DEFICIENCY: ICD-10-CM

## 2025-07-28 DIAGNOSIS — K90.9 INTESTINAL MALABSORPTION, UNSPECIFIED TYPE (HHS-HCC): ICD-10-CM

## (undated) DEVICE — ADHESIVE, SKIN, DERMABOND ADVANCED, 15CM, PEN-STYLE

## (undated) DEVICE — ENDO, TROCAR 12 X 100 BLADELESS, W / STABILITY SLEEVE

## (undated) DEVICE — TROCAR, OPTICAL, BLADELESS, 12MM, THREADED, 100MM LENGTH

## (undated) DEVICE — APPLICATOR, CHLORAPREP, W/ORANGE TINT, 26ML

## (undated) DEVICE — STAPLER, ECHELON FLEX GST, POWERED PLUS, 60MM X 44CM, STANDARD

## (undated) DEVICE — GLOVE, SURGICAL, PROTEXIS PI , 7.0, PF, LF

## (undated) DEVICE — SOLUTION, INJECTION, 0.9% SODIUM CHL, USP LIFECARE 1000 MI

## (undated) DEVICE — SUTURE, VICRYL, 3-0, 27 IN, SH, VIOLET

## (undated) DEVICE — TK TI-KNOT DEVICE

## (undated) DEVICE — Device

## (undated) DEVICE — LAVAGE KIT, GASTRIC, EDLICH, 34 FR, 36 IN

## (undated) DEVICE — APPLIER,  LIGACLIP, ENDO ROTATE, ROTATING, 10MM 20M/L, DISP

## (undated) DEVICE — STAPLER,  ENDO ECHELON 45MM RELOAD, BLUE

## (undated) DEVICE — WATER STERILE, FOR IRRIGATION, 1000ML, W/HANGER

## (undated) DEVICE — STAPLER,  LINEAR ENDO 60MM RELOAD, GREEN, DISP

## (undated) DEVICE — RETRIEVAL SYSTEM, MONARCH, 10MM DISP ENDOSCOPIC

## (undated) DEVICE — DRAIN, PENROSE, 1/4 IN X 18 IN, STERILE, LF

## (undated) DEVICE — 5MM X 31CM  AESCULAP HOOK SCISSOR INSERT, DISPOSABLE

## (undated) DEVICE — STAPLER, ENDO ECHELON 45MM RELOAD, WHITE, REUSABLE

## (undated) DEVICE — SUTURE, ETHIBOND, XTRA, 2-0, GREEN

## (undated) DEVICE — TK KNOT QUICK LOAD

## (undated) DEVICE — STAPLER, EF POWERED PLUS, CUTTER 340MM, 45MM EFFECTOR, DISP

## (undated) DEVICE — SLEEVE, KII, Z-THREAD, 5X100CM

## (undated) DEVICE — STAPLER, ENDO ECHELON 45MM RELOAD, GOLD

## (undated) DEVICE — GLOVE, SURGICAL, PROTEXIS PI BLUE W/NEUTHERA, 6.5, PF, LF

## (undated) DEVICE — SOLUTION, IRRIGATION, X RX SODIUM CHL 0.9%, 1000ML BTL

## (undated) DEVICE — MARKER, SKIN, DUAL TIP, W/RULER AND LABEL

## (undated) DEVICE — STAPLER, LINEAR ENDO RELOAD, 60MM, BLUE, DISP

## (undated) DEVICE — APPLIER, LIGACLIP ENDO ROTATING, MULTIPLE CLIP, 20 LG

## (undated) DEVICE — VISTASEAL DUAL APPLICATOR

## (undated) DEVICE — SHEARS, HARMONIC 5 X 36 CVD ACE+7

## (undated) DEVICE — SLEEVE, KII, Z-THREAD, 12X100CM

## (undated) DEVICE — SUTURE, VICRYL, 2-0, 27 IN, BR/SH 27, VIOLET

## (undated) DEVICE — IRRIGATOR, WOUND, HYDRO SURG PLUS, W/O TIP, DISP

## (undated) DEVICE — GLOVE, SURGICAL, PROTEXIS PI , 6.5, PF, LF

## (undated) DEVICE — TROCAR, KII OPTICAL BLADELESS 5MM Z THREAD 100MM LNGTH

## (undated) DEVICE — PAD, GROUNDING, ELECTROSURGICAL, W/9 FT CABLE, POLYHESIVE II, ADULT, LF